# Patient Record
Sex: FEMALE | NOT HISPANIC OR LATINO
[De-identification: names, ages, dates, MRNs, and addresses within clinical notes are randomized per-mention and may not be internally consistent; named-entity substitution may affect disease eponyms.]

---

## 2021-03-05 ENCOUNTER — APPOINTMENT (OUTPATIENT)
Dept: BREAST CENTER | Facility: CLINIC | Age: 46
End: 2021-03-05

## 2023-01-25 PROBLEM — Z00.00 ENCOUNTER FOR PREVENTIVE HEALTH EXAMINATION: Status: ACTIVE | Noted: 2023-01-25

## 2023-01-26 ENCOUNTER — APPOINTMENT (OUTPATIENT)
Dept: OTOLARYNGOLOGY | Facility: CLINIC | Age: 48
End: 2023-01-26
Payer: COMMERCIAL

## 2023-01-26 VITALS — WEIGHT: 135 LBS | HEIGHT: 69.5 IN | BODY MASS INDEX: 19.54 KG/M2

## 2023-01-26 DIAGNOSIS — M26.609 UNSPECIFIED TEMPOROMANDIBULAR JOINT DISORDER: ICD-10-CM

## 2023-01-26 DIAGNOSIS — J02.9 ACUTE PHARYNGITIS, UNSPECIFIED: ICD-10-CM

## 2023-01-26 DIAGNOSIS — J30.0 VASOMOTOR RHINITIS: ICD-10-CM

## 2023-01-26 DIAGNOSIS — H92.09 OTALGIA, UNSPECIFIED EAR: ICD-10-CM

## 2023-01-26 PROCEDURE — 31575 DIAGNOSTIC LARYNGOSCOPY: CPT

## 2023-01-26 PROCEDURE — 99203 OFFICE O/P NEW LOW 30 MIN: CPT | Mod: 25

## 2023-01-26 RX ORDER — IPRATROPIUM BROMIDE 42 UG/1
0.06 SPRAY NASAL 3 TIMES DAILY
Qty: 1 | Refills: 1 | Status: ACTIVE | COMMUNITY
Start: 2023-01-26 | End: 1900-01-01

## 2023-01-26 RX ORDER — LEVOTHYROXINE SODIUM 137 UG/1
TABLET ORAL
Refills: 0 | Status: ACTIVE | COMMUNITY

## 2023-01-26 NOTE — PROCEDURE
[de-identified] : PROCEDURE: Flexible laryngoscopy\par SURGEON: Dr. Ellis\par INDICATIONS: He was unable to tolerate a mirror exam. Assess for laryngopharynx pharyngeal reflux. cough. head and neck mass. \par ANESTHESIA: The patient was placed in a sitting position.  Following application of the topical anesthetic and decongestant, exam was performed with a flexible scope.  The scope was passed along the right nasal floor to the nasopharynx.  It was then passed into the region of the middle meatus, middle turbinate, and sphenoethmoid region.  An identical procedure was performed on the left side.  The following findings were noted:\par \par The nasal musoca was healthy appearing and the septum was roughly midline. The middle meatus and sphenoethmoid recesses were clear bilaterally. The nasopharynx \par \par Nasopharynx: no masses, choanae patent, no adenoid tissue\par \par Base of tongue/vallecula: no masses or asymmetry\par Pharyngeal walls: symmetrical. No masses.\par Pyriform sinuses: no lesions or pooling of secretions.\par Epiglottis: normal. No edema or lesions.\par Aryepiglottic folds: normal. No lesions. \par Vocal cords: clear and mobile. No lesions. Airway patent.\par Arytenoids: no edema or erythema. \par Interarytenoid area: no edema, erythema or lesion.\par

## 2023-01-26 NOTE — ASSESSMENT
[FreeTextEntry1] : In my opinion her primary pathology is TMJ D.\par She uses at bite block.\par I feel that she would benefit from a full evaluation by a TMJ D specialist.\par I have reassured her I see no evidence of an ear infection or throat infection.\par I will give her a trial of Atrovent for her vasomotor rhinitis.

## 2023-01-26 NOTE — HISTORY OF PRESENT ILLNESS
[de-identified] : She is known to me.  I have not seen her in many years.\par Her chief complaint is "ear pain".\par She reports that several weeks ago she had what sounds like a syncopal episode with a loss of consciousness.\par According to her she had a 2-day admission to the hospital and had a full work-up which was all negative.\par She was told that perhaps she had a sinus problem.\par She has pain in her ears that is radiating to the back of her head.\par He took a course of cefdinir which did not help her.\par She does not have a fever.\par He does complain of postnasal drip.

## 2024-03-04 ENCOUNTER — NON-APPOINTMENT (OUTPATIENT)
Age: 49
End: 2024-03-04

## 2024-03-04 ENCOUNTER — APPOINTMENT (OUTPATIENT)
Dept: BREAST CENTER | Facility: CLINIC | Age: 49
End: 2024-03-04
Payer: COMMERCIAL

## 2024-03-04 VITALS
HEIGHT: 69.5 IN | DIASTOLIC BLOOD PRESSURE: 71 MMHG | HEART RATE: 73 BPM | WEIGHT: 133 LBS | SYSTOLIC BLOOD PRESSURE: 105 MMHG | BODY MASS INDEX: 19.26 KG/M2

## 2024-03-04 DIAGNOSIS — Z80.3 FAMILY HISTORY OF MALIGNANT NEOPLASM OF BREAST: ICD-10-CM

## 2024-03-04 DIAGNOSIS — Z86.2 PERSONAL HISTORY OF DISEASES OF THE BLOOD AND BLOOD-FORMING ORGANS AND CERTAIN DISORDERS INVOLVING THE IMMUNE MECHANISM: ICD-10-CM

## 2024-03-04 DIAGNOSIS — Z12.39 ENCOUNTER FOR OTHER SCREENING FOR MALIGNANT NEOPLASM OF BREAST: ICD-10-CM

## 2024-03-04 DIAGNOSIS — Z80.41 FAMILY HISTORY OF MALIGNANT NEOPLASM OF OVARY: ICD-10-CM

## 2024-03-04 PROCEDURE — 99204 OFFICE O/P NEW MOD 45 MIN: CPT

## 2024-03-04 NOTE — PHYSICAL EXAM
[Normocephalic] : normocephalic [Supple] : supple [EOMI] : extra ocular movement intact [No Cervical Adenopathy] : no cervical adenopathy [No Supraclavicular Adenopathy] : no supraclavicular adenopathy [de-identified] : Bilateral breast/axilla/supraclavicular area: No masses, discharge, or adenopathy

## 2024-03-04 NOTE — REVIEW OF SYSTEMS
[Chills] : no chills [Fever] : no fever [Cough] : no cough [Shortness Of Breath] : no shortness of breath [Skin Wound] : no skin wound [Skin Lesions] : no skin lesions

## 2024-03-04 NOTE — HISTORY OF PRESENT ILLNESS
[FreeTextEntry1] : Patient is a 49 yo F who presents today for breast cancer screening and complaints of L palpable lump x 3 weeks ago which has decreased, OLD/NEW LOV 8/2020. She has a strong family history of breast cancer and ovarian cancer. Patient is BRCA negative w/ VUS in [facility] (2018). S/P R benign bx. Patient denies skin changes, or nipple discharge bilaterally. Last breast MRI was 2019 for high-risk screening and last annual B/l MG/US was 8/2020.  JOSEPH Lifetime Risk- 15.3%  11/8/19: MRI- heterogeneously dense, OXANA. BIRADS 1. 8/28/20: B/l MG & US- heterogeneously dense, OXANA. BIRADS 1.

## 2024-03-04 NOTE — PAST MEDICAL HISTORY
[Postmenopausal] : The patient is postmenopausal [Menarche Age ____] : age at menarche was [unfilled] [Menopause Age____] : age at menopause was [unfilled] [Definite ___ (Date)] : the last menstrual period was [unfilled] [Total Preg ___] : G[unfilled] [Live Births ___] : P[unfilled]  [History of Hormone Replacement Treatment] : has no history of hormone replacement treatment [de-identified] : 2018 [FreeTextEntry6] : n/a [FreeTextEntry7] : no [FreeTextEntry8] : n/a

## 2024-10-15 ENCOUNTER — INPATIENT (INPATIENT)
Facility: HOSPITAL | Age: 49
LOS: 6 days | Discharge: HOME | End: 2024-10-22
Attending: INTERNAL MEDICINE | Admitting: HOSPITALIST
Payer: COMMERCIAL

## 2024-10-15 VITALS
WEIGHT: 130.07 LBS | HEIGHT: 69 IN | OXYGEN SATURATION: 100 % | HEART RATE: 100 BPM | DIASTOLIC BLOOD PRESSURE: 59 MMHG | SYSTOLIC BLOOD PRESSURE: 108 MMHG | RESPIRATION RATE: 18 BRPM | TEMPERATURE: 98 F

## 2024-10-15 DIAGNOSIS — R10.9 UNSPECIFIED ABDOMINAL PAIN: ICD-10-CM

## 2024-10-15 DIAGNOSIS — E03.9 HYPOTHYROIDISM, UNSPECIFIED: ICD-10-CM

## 2024-10-15 DIAGNOSIS — Z29.9 ENCOUNTER FOR PROPHYLACTIC MEASURES, UNSPECIFIED: ICD-10-CM

## 2024-10-15 DIAGNOSIS — R39.15 URGENCY OF URINATION: ICD-10-CM

## 2024-10-15 DIAGNOSIS — K90.0 CELIAC DISEASE: ICD-10-CM

## 2024-10-15 DIAGNOSIS — K59.09 OTHER CONSTIPATION: ICD-10-CM

## 2024-10-15 LAB
ALBUMIN SERPL ELPH-MCNC: 4.7 G/DL — SIGNIFICANT CHANGE UP (ref 3.3–5)
ALP SERPL-CCNC: 78 U/L — SIGNIFICANT CHANGE UP (ref 40–120)
ALT FLD-CCNC: 8 U/L — LOW (ref 10–45)
ANION GAP SERPL CALC-SCNC: 10 MMOL/L — SIGNIFICANT CHANGE UP (ref 5–17)
APPEARANCE UR: CLEAR — SIGNIFICANT CHANGE UP
AST SERPL-CCNC: 14 U/L — SIGNIFICANT CHANGE UP (ref 10–40)
BASOPHILS # BLD AUTO: 0.06 K/UL — SIGNIFICANT CHANGE UP (ref 0–0.2)
BASOPHILS NFR BLD AUTO: 1.1 % — SIGNIFICANT CHANGE UP (ref 0–2)
BILIRUB SERPL-MCNC: 0.6 MG/DL — SIGNIFICANT CHANGE UP (ref 0.2–1.2)
BILIRUB UR-MCNC: NEGATIVE — SIGNIFICANT CHANGE UP
BUN SERPL-MCNC: 8 MG/DL — SIGNIFICANT CHANGE UP (ref 7–23)
CALCIUM SERPL-MCNC: 9.4 MG/DL — SIGNIFICANT CHANGE UP (ref 8.4–10.5)
CHLORIDE SERPL-SCNC: 107 MMOL/L — SIGNIFICANT CHANGE UP (ref 96–108)
CO2 SERPL-SCNC: 23 MMOL/L — SIGNIFICANT CHANGE UP (ref 22–31)
COLOR SPEC: YELLOW — SIGNIFICANT CHANGE UP
CREAT SERPL-MCNC: 0.8 MG/DL — SIGNIFICANT CHANGE UP (ref 0.5–1.3)
DIFF PNL FLD: NEGATIVE — SIGNIFICANT CHANGE UP
EGFR: 90 ML/MIN/1.73M2 — SIGNIFICANT CHANGE UP
EOSINOPHIL # BLD AUTO: 0.04 K/UL — SIGNIFICANT CHANGE UP (ref 0–0.5)
EOSINOPHIL NFR BLD AUTO: 0.7 % — SIGNIFICANT CHANGE UP (ref 0–6)
GLUCOSE SERPL-MCNC: 105 MG/DL — HIGH (ref 70–99)
GLUCOSE UR QL: NEGATIVE MG/DL — SIGNIFICANT CHANGE UP
HCG SERPL-ACNC: 1 MIU/ML — SIGNIFICANT CHANGE UP
HCT VFR BLD CALC: 37.8 % — SIGNIFICANT CHANGE UP (ref 34.5–45)
HGB BLD-MCNC: 12.8 G/DL — SIGNIFICANT CHANGE UP (ref 11.5–15.5)
IMM GRANULOCYTES NFR BLD AUTO: 0.4 % — SIGNIFICANT CHANGE UP (ref 0–0.9)
KETONES UR-MCNC: NEGATIVE MG/DL — SIGNIFICANT CHANGE UP
LEUKOCYTE ESTERASE UR-ACNC: NEGATIVE — SIGNIFICANT CHANGE UP
LIDOCAIN IGE QN: 21 U/L — SIGNIFICANT CHANGE UP (ref 7–60)
LYMPHOCYTES # BLD AUTO: 2.2 K/UL — SIGNIFICANT CHANGE UP (ref 1–3.3)
LYMPHOCYTES # BLD AUTO: 38.8 % — SIGNIFICANT CHANGE UP (ref 13–44)
MCHC RBC-ENTMCNC: 31.8 PG — SIGNIFICANT CHANGE UP (ref 27–34)
MCHC RBC-ENTMCNC: 33.9 GM/DL — SIGNIFICANT CHANGE UP (ref 32–36)
MCV RBC AUTO: 94 FL — SIGNIFICANT CHANGE UP (ref 80–100)
MONOCYTES # BLD AUTO: 0.41 K/UL — SIGNIFICANT CHANGE UP (ref 0–0.9)
MONOCYTES NFR BLD AUTO: 7.2 % — SIGNIFICANT CHANGE UP (ref 2–14)
NEUTROPHILS # BLD AUTO: 2.94 K/UL — SIGNIFICANT CHANGE UP (ref 1.8–7.4)
NEUTROPHILS NFR BLD AUTO: 51.8 % — SIGNIFICANT CHANGE UP (ref 43–77)
NITRITE UR-MCNC: NEGATIVE — SIGNIFICANT CHANGE UP
NRBC # BLD: 0 /100 WBCS — SIGNIFICANT CHANGE UP (ref 0–0)
PH UR: 5 — SIGNIFICANT CHANGE UP (ref 5–8)
PLATELET # BLD AUTO: 265 K/UL — SIGNIFICANT CHANGE UP (ref 150–400)
POTASSIUM SERPL-MCNC: 4.8 MMOL/L — SIGNIFICANT CHANGE UP (ref 3.5–5.3)
POTASSIUM SERPL-SCNC: 4.8 MMOL/L — SIGNIFICANT CHANGE UP (ref 3.5–5.3)
PROT SERPL-MCNC: 7.4 G/DL — SIGNIFICANT CHANGE UP (ref 6–8.3)
PROT UR-MCNC: NEGATIVE MG/DL — SIGNIFICANT CHANGE UP
RBC # BLD: 4.02 M/UL — SIGNIFICANT CHANGE UP (ref 3.8–5.2)
RBC # FLD: 12.7 % — SIGNIFICANT CHANGE UP (ref 10.3–14.5)
SODIUM SERPL-SCNC: 140 MMOL/L — SIGNIFICANT CHANGE UP (ref 135–145)
SP GR SPEC: 1.02 — SIGNIFICANT CHANGE UP (ref 1–1.03)
UROBILINOGEN FLD QL: 0.2 MG/DL — SIGNIFICANT CHANGE UP (ref 0.2–1)
WBC # BLD: 5.67 K/UL — SIGNIFICANT CHANGE UP (ref 3.8–10.5)
WBC # FLD AUTO: 5.67 K/UL — SIGNIFICANT CHANGE UP (ref 3.8–10.5)

## 2024-10-15 PROCEDURE — 74177 CT ABD & PELVIS W/CONTRAST: CPT | Mod: 26,MC

## 2024-10-15 PROCEDURE — 76770 US EXAM ABDO BACK WALL COMP: CPT | Mod: 26

## 2024-10-15 PROCEDURE — 99223 1ST HOSP IP/OBS HIGH 75: CPT

## 2024-10-15 PROCEDURE — 76856 US EXAM PELVIC COMPLETE: CPT | Mod: 26

## 2024-10-15 PROCEDURE — 76830 TRANSVAGINAL US NON-OB: CPT | Mod: 26

## 2024-10-15 PROCEDURE — 99221 1ST HOSP IP/OBS SF/LOW 40: CPT

## 2024-10-15 PROCEDURE — 99285 EMERGENCY DEPT VISIT HI MDM: CPT

## 2024-10-15 RX ORDER — MAGNESIUM, ALUMINUM HYDROXIDE 200-200 MG
30 TABLET,CHEWABLE ORAL EVERY 4 HOURS
Refills: 0 | Status: DISCONTINUED | OUTPATIENT
Start: 2024-10-15 | End: 2024-10-22

## 2024-10-15 RX ORDER — KETOROLAC TROMETHAMINE 30 MG/ML
30 INJECTION INTRAMUSCULAR; INTRAVENOUS EVERY 6 HOURS
Refills: 0 | Status: DISCONTINUED | OUTPATIENT
Start: 2024-10-15 | End: 2024-10-15

## 2024-10-15 RX ORDER — LEVOTHYROXINE SODIUM 88 MCG
1 TABLET ORAL
Refills: 0 | DISCHARGE

## 2024-10-15 RX ORDER — MORPHINE SULFATE 30 MG/1
4 TABLET, EXTENDED RELEASE ORAL EVERY 6 HOURS
Refills: 0 | Status: DISCONTINUED | OUTPATIENT
Start: 2024-10-15 | End: 2024-10-16

## 2024-10-15 RX ORDER — POLYETHYLENE GLYCOL 3350 17 G/17G
17 POWDER, FOR SOLUTION ORAL EVERY 12 HOURS
Refills: 0 | Status: DISCONTINUED | OUTPATIENT
Start: 2024-10-15 | End: 2024-10-22

## 2024-10-15 RX ORDER — ACETAMINOPHEN 500 MG
650 TABLET ORAL ONCE
Refills: 0 | Status: COMPLETED | OUTPATIENT
Start: 2024-10-15 | End: 2024-10-15

## 2024-10-15 RX ORDER — SODIUM CHLORIDE 9 MG/ML
1000 INJECTION, SOLUTION INTRAMUSCULAR; INTRAVENOUS; SUBCUTANEOUS ONCE
Refills: 0 | Status: COMPLETED | OUTPATIENT
Start: 2024-10-15 | End: 2024-10-15

## 2024-10-15 RX ORDER — LIDOCAINE HYDROCHLORIDE 40 MG/ML
1 SOLUTION TOPICAL EVERY 24 HOURS
Refills: 0 | Status: DISCONTINUED | OUTPATIENT
Start: 2024-10-15 | End: 2024-10-22

## 2024-10-15 RX ORDER — MELATONIN 5 MG
3 TABLET ORAL AT BEDTIME
Refills: 0 | Status: DISCONTINUED | OUTPATIENT
Start: 2024-10-15 | End: 2024-10-22

## 2024-10-15 RX ORDER — LEVOTHYROXINE SODIUM 88 MCG
50 TABLET ORAL EVERY 24 HOURS
Refills: 0 | Status: DISCONTINUED | OUTPATIENT
Start: 2024-10-16 | End: 2024-10-22

## 2024-10-15 RX ORDER — INFLUENZ VIR VAC TV P-SURF2003 15MCG/.5ML
0.5 SYRINGE (ML) INTRAMUSCULAR ONCE
Refills: 0 | Status: DISCONTINUED | OUTPATIENT
Start: 2024-10-15 | End: 2024-10-22

## 2024-10-15 RX ORDER — MORPHINE SULFATE 30 MG/1
4 TABLET, EXTENDED RELEASE ORAL ONCE
Refills: 0 | Status: DISCONTINUED | OUTPATIENT
Start: 2024-10-15 | End: 2024-10-15

## 2024-10-15 RX ORDER — ACETAMINOPHEN 500 MG
650 TABLET ORAL EVERY 6 HOURS
Refills: 0 | Status: DISCONTINUED | OUTPATIENT
Start: 2024-10-15 | End: 2024-10-22

## 2024-10-15 RX ORDER — ENOXAPARIN SODIUM 40MG/0.4ML
40 SYRINGE (ML) SUBCUTANEOUS EVERY 24 HOURS
Refills: 0 | Status: DISCONTINUED | OUTPATIENT
Start: 2024-10-15 | End: 2024-10-22

## 2024-10-15 RX ORDER — ONDANSETRON HYDROCHLORIDE 2 MG/ML
4 INJECTION, SOLUTION INTRAMUSCULAR; INTRAVENOUS EVERY 8 HOURS
Refills: 0 | Status: DISCONTINUED | OUTPATIENT
Start: 2024-10-15 | End: 2024-10-22

## 2024-10-15 RX ORDER — KETOROLAC TROMETHAMINE 30 MG/ML
15 INJECTION INTRAMUSCULAR; INTRAVENOUS EVERY 6 HOURS
Refills: 0 | Status: DISCONTINUED | OUTPATIENT
Start: 2024-10-15 | End: 2024-10-17

## 2024-10-15 RX ORDER — SENNA 187 MG
2 TABLET ORAL AT BEDTIME
Refills: 0 | Status: DISCONTINUED | OUTPATIENT
Start: 2024-10-15 | End: 2024-10-22

## 2024-10-15 RX ORDER — ONDANSETRON HYDROCHLORIDE 2 MG/ML
4 INJECTION, SOLUTION INTRAMUSCULAR; INTRAVENOUS ONCE
Refills: 0 | Status: COMPLETED | OUTPATIENT
Start: 2024-10-15 | End: 2024-10-15

## 2024-10-15 RX ADMIN — MORPHINE SULFATE 4 MILLIGRAM(S): 30 TABLET, EXTENDED RELEASE ORAL at 18:30

## 2024-10-15 RX ADMIN — MORPHINE SULFATE 4 MILLIGRAM(S): 30 TABLET, EXTENDED RELEASE ORAL at 13:22

## 2024-10-15 RX ADMIN — MORPHINE SULFATE 4 MILLIGRAM(S): 30 TABLET, EXTENDED RELEASE ORAL at 14:07

## 2024-10-15 RX ADMIN — Medication 650 MILLIGRAM(S): at 10:16

## 2024-10-15 RX ADMIN — POLYETHYLENE GLYCOL 3350 17 GRAM(S): 17 POWDER, FOR SOLUTION ORAL at 18:30

## 2024-10-15 RX ADMIN — ONDANSETRON HYDROCHLORIDE 4 MILLIGRAM(S): 2 INJECTION, SOLUTION INTRAMUSCULAR; INTRAVENOUS at 13:21

## 2024-10-15 RX ADMIN — Medication 650 MILLIGRAM(S): at 14:07

## 2024-10-15 RX ADMIN — MORPHINE SULFATE 4 MILLIGRAM(S): 30 TABLET, EXTENDED RELEASE ORAL at 18:45

## 2024-10-15 RX ADMIN — Medication 2 TABLET(S): at 22:42

## 2024-10-15 NOTE — H&P ADULT - ASSESSMENT
48F w/ PMH of hypothyroid, celiac disease, appendectomy and chronic constipation, who is presenting with 2 months of severe lower abdominal pain and urinary urgency, with no abnormal findings on pelvic exam, transvaginal and pelvic US and CTAP, being admitted for management of severe abdominal  48F w/ PMH of hypothyroid, celiac disease, appendectomy and chronic constipation, who is presenting with 2 months of severe lower abdominal pain and urinary urgency, with no abnormal findings on pelvic exam, transvaginal and pelvic US and CTAP, being admitted for management of severe abdominal pain

## 2024-10-15 NOTE — ED PROVIDER NOTE - PROGRESS NOTE DETAILS
MD Cheryl:  - hcg neg. ua wnl  - tvus: IMPRESSION:  Normal pelvic sonogram.  - ctap: IMPRESSION:  No acute findings.  No evidence of acute pyelonephritis.   - pt states she's in pain, morphine ordered. on reassessment pt states she is severe abdominal pain despite negative workup . abdomen soft ndnt on exam. pt states she is afraid to go home because she can't walk due to severe pain. more morphine offered which pt declines. pt accepts offer for admission for intractable abdominal pain. d/w liam, will admit

## 2024-10-15 NOTE — H&P ADULT - ATTENDING COMMENTS
48F with hypoTh, celiac disease, appendectomy 2017, chronic constipation with chronic abdominal pain, presenting for subacute worsening of lower abd pain and dysuria.     Extensive work up in ED done including CT AP, TVUS, and RP US without acute findings. Does have stool burden. UA is bland and labs unremarkable. Patient is overall well-appearing and conversant, however became very distressed and tearful when discussing her pain and long process and attempts at diagnosis and improvement. Deferred abd palpation exam due to patient distress and discomfort, she states that even light stroking of her skin overlying pelvis causes significant pain. Not associated with food intake. Weight fluctuates but no significant weight loss.    #subacute on chronic abd pain - suspect multifactorial etiology, no acute pathology seen on extensive imaging done in ED. Suspect component of chronic constipation and IBS-C with hyperesthesia, pain is OOP to exam. No indication of infectious process, low concern for mesenteric ischemia given no risk factors and largely normal labs, though can consider CTA for eval if no improvement with bowel regimen.   - trial of bowel regimen with miralax, senna - escalate pending clinical response  - discussed at length avoidance of opioids due to worsening constipation and concern for opioid hyperalgesia, however pt requiring relief to pain in order to facilitate treatment - can do morphine 4 q6h with plans to stop pending response to bowel regimen, discussed this with her  - encouraged PO hydration, ambulation  - pending clinical course, consider GI consult vs CTA A/P if clinical concern for mesenteric ischemia  - less likely endometriosis given post-menopausal for years    #dusyria #urinary frequency - not c/w UTI, likely interstitial cystitis vs atrophic vaginitis iso early menopause.  - Uro consulted, rec bladder scan to eval for retention, outpt urodynamic studies  - try ditropan, pyridium

## 2024-10-15 NOTE — H&P ADULT - NSHPPHYSICALEXAM_GEN_ALL_CORE
GENERAL: in mild distress, at times tearful, sitting in bed   HEAD:  Atraumatic, normocephalic  EYES: EOMI, PERRL  NECK: Supple, trachea midline, no JVD  HEART: Regular rate and rhythm  LUNGS: Unlabored respirations.  Clear to auscultation bilaterally, no crackles, wheezing, or rhonchi  ABDOMEN: hard to palpation, tender in all 4 quadrants, mildly distended, +BS  EXTREMITIES: 2+ peripheral pulses bilaterally. No clubbing, cyanosis, or edema  NERVOUS SYSTEM:  A&Ox3, moving all extremities, no focal deficits

## 2024-10-15 NOTE — ED ADULT NURSE NOTE - NSFALLUNIVINTERV_ED_ALL_ED
Bed/Stretcher in lowest position, wheels locked, appropriate side rails in place/Call bell, personal items and telephone in reach/Instruct patient to call for assistance before getting out of bed/chair/stretcher/Non-slip footwear applied when patient is off stretcher/[place] to call system/Physically safe environment - no spills, clutter or unnecessary equipment/Purposeful proactive rounding/Room/bathroom lighting operational, light cord in reach

## 2024-10-15 NOTE — ED PROVIDER NOTE - PHYSICAL EXAMINATION
GENERAL:  Awake, alert and in NAD, non-toxic appearing  ENMT: Airway patent  EYES: conjunctiva clear  CARDIAC: Regular rate, regular rhythm.  Heart sounds S1, S2, no S3, S4. No murmurs, rubs or gallops.  RESPIRATORY: Breath sounds clear and equal in bilateral anterior lung fields, no wheezes/ronchi/crackles/stridor; pt breathing and speaking comfortably with no increased WOB, no accessory mm. use, no intercostal retractions, no nasal flaring  GI: abdomen soft, non-distended, non-tender, no rebound or guarding in ruq/luq/rlq/llq/epigastrium/suprapubic region, ?R CVAT   (chaperone RN): scant creamy white vaginal discharge, no lesions/erythema/fb/bleeding, no cottage cheese like discharge, no bilat adnexal ttp or cmt  SKIN: warm and dry, no rashes  PSYCH: awake, alert, calm and cooperative  EXTREMITIES: no ble edema  NEURO: gcs 15

## 2024-10-15 NOTE — H&P ADULT - PROBLEM SELECTOR PLAN 1
Pt presenting with 2 months of severe lower abdominal pain L>R, radiating towards chest iso chronic constipation as well as concurrent urinary urgency related pain, w/ associated n/v, lightheadedness, and joint pains. CTAP shows stool burden, abdominal pain likely 2/2 chronic constipation, last "normal" BM was 2 weeks ago, pt has some scant BM every other day that are very soft/watery. Unlikely ovarian torsion, ectopic pregnancy, diverticulitis given negative imaging and labs.     Plan:  - bowel regimen: Miralax BID, senna QHS, lactulose, consider tap water enema  - monitor stool counts  - pain regimen: standing tylenol 1g q8, lidocaine patches, heat and ice packs; Toradol 15mg IVP q6 PRN, morphine 2mg IVP PRN for moderate, morphine 4mg IVP PRN for severe   - can consider uptitrating pain regimen if not adequately treating pain Pt p/w 2 months of severe lower abdominal pain L>R, radiating towards chest iso chronic constipation as well as concurrent urinary urgency related pain, w/ associated n/v, lightheadedness, and joint pains. CTAP shows stool burden, abdominal pain likely 2/2 chronic constipation, last "normal" BM was 2 weeks ago, pt has some scant BM every other day that are very soft/watery. Less likely abdominal 2/2 ovarian torsion, ectopic pregnancy, diverticulitis, GI obstruction given negative imaging and labs.     Plan:  - bowel regimen: Miralax BID, senna QHS, lactulose, consider tap water enema  - monitor stool counts  - pain regimen: standing tylenol 1g q8, lidocaine patches, heat and ice packs; Toradol 15mg IVP q6 PRN, morphine 2mg IVP PRN for moderate, morphine 4mg IVP PRN for severe   - can consider uptitrating pain regimen if not adequately treating pain Pt p/w 2 months of severe lower abdominal pain L>R, radiating towards chest iso chronic constipation as well as concurrent urinary urgency related pain, w/ associated n/v, lightheadedness, and joint pains. CTAP shows stool burden, abdominal pain likely 2/2 chronic constipation, last "normal" BM was 2 weeks ago, pt has some scant BM every other day that are very soft/watery. Can consider chronic mesenteric ischemia if no improvement in pain despite BMs. Less likely abdominal pain 2/2 ovarian torsion, ectopic pregnancy, diverticulitis, GI obstruction given negative imaging and labs.     Plan:  - bowel regimen: Miralax BID, senna QHS, consider tap water enema if no BM in 24 hours, if no BM by tonight can offer suppository  - monitor stool counts  - pain regimen: lidocaine patches, heat and ice packs; tylenol 1g q8 PRN, Toradol 15mg IVP q6 PRN for moderate, toradol 30mg IVP q6 PRN for severe  - can consider up-titrating pain regimen if not adequately treating pain Pt p/w 2 months of severe lower abdominal pain L>R, radiating towards chest iso chronic constipation as well as concurrent urinary urgency related pain, w/ associated n/v, lightheadedness, and joint pains. CTAP shows stool burden, abdominal pain likely 2/2 chronic constipation, last "normal" BM was 2 weeks ago, pt has some scant BM every other day that are very soft/watery. Can consider chronic mesenteric ischemia if no improvement in pain despite BMs. Less likely abdominal pain 2/2 ovarian torsion, ectopic pregnancy, diverticulitis, GI obstruction given negative imaging and labs.     Plan:  - bowel regimen: Miralax BID, senna QHS, consider tap water enema if no BM in 24 hours, if no BM by tonight can offer suppository  - monitor stool counts  - pain regimen: lidocaine patches, heat and ice packs; tylenol 1g q8 PRN, Toradol 15mg IVP q6 PRN for moderate, morphine 4mg IVP q6 PRN for severe pain  - can consider up-titrating pain regimen if not adequately treating pain

## 2024-10-15 NOTE — CONSULT NOTE ADULT - SUBJECTIVE AND OBJECTIVE BOX
CONSULT NOTE:  HPI:   49 yo f with pmhx hypothyroid, appendectomy, presenting to ER with intractable pain, Patients states pain has been present approx one month, States she saw her PMD who told her it was uti but never sent a urine culture and started her on 4 seperate abx for a total of 21 days (levofloxacin, cipro, cefuroxime and ?) she believes while taking one of them her symptoms improved slightly. She complains, of nausea, intermittent vomiting ( most recently this morning x 1 ) and chronic constipation. She denies any history of stones, or any flank pain at this time. Patient also complains of incomplete bladder emptying, states as soon as she voids she feels like she has to void again. She complains of bladder pressure, frequency, intermittent dysuria but denies hematuria. States saw gynocologist recently who told her the exam was totally normal but states she has been on chronic fluconazole for weeks for presumed yeast infection.     Vital Signs Last 24 Hrs  T(C): 37.1 (15 Oct 2024 13:13), Max: 37.1 (15 Oct 2024 13:13)  T(F): 98.7 (15 Oct 2024 13:13), Max: 98.7 (15 Oct 2024 13:13)  HR: 69 (15 Oct 2024 13:13) (69 - 100)  BP: 129/83 (15 Oct 2024 13:13) (108/59 - 129/83)  BP(mean): --  RR: 18 (15 Oct 2024 13:13) (18 - 18)  SpO2: 99% (15 Oct 2024 13:13) (99% - 100%)    Parameters below as of 15 Oct 2024 13:13  Patient On (Oxygen Delivery Method): room air      I&O's Summary      PE:  Gen: nad  Abd: soft, NT, ND  : voiding clear urine    LABS:                        12.8   5.67  )-----------( 265      ( 15 Oct 2024 09:29 )             37.8     10-15    140  |  107  |  8   ----------------------------<  105[H]  4.8   |  23  |  0.80    Ca    9.4      15 Oct 2024 09:29    TPro  7.4  /  Alb  4.7  /  TBili  0.6  /  DBili  x   /  AST  14  /  ALT  8[L]  /  AlkPhos  78  10-15      Cultures      A/P 49 yo f with intractable abd pain  1) would follow up urine culture  2) cont abx  3) q4-6 hour pvrs to rule out retention  4) bowel reg to resolve constipation  5) can try ditropan and pyridium  6) will need outpattient follow up for outpatient urodynamic studies

## 2024-10-15 NOTE — ED ADULT NURSE NOTE - OBJECTIVE STATEMENT
The patient is a 48y Female complaining of urinary symptoms. Pt reports pelvic pain and urinary urgency n/v x 2 months on and off, has been prescribed abx previously and dx with yeast infection. Pt denies CP, SOB, F/C.

## 2024-10-15 NOTE — H&P ADULT - PROBLEM SELECTOR PLAN 4
Pt on home synthroid    Plan:  - continue synthroid Pt on home synthroid 50mcg    Plan:  - continue synthroid 50mcg

## 2024-10-15 NOTE — ED ADULT NURSE REASSESSMENT NOTE - NS ED NURSE REASSESS COMMENT FT1
Patient aoX3, no c/o of pelvic pain or dysuria at this time s/p meds.  Vital signs stable.  For regional medicine admit.  Transfer pending.
Patient care and endorsment received from previous RN.  Patient a/oX 4, ambulatory, c/o of 2 months UTI symptoms, Dx w/ yeast infection, completed antibiotics but recurrence of pelvic pain, dysuria and urinary frequency.  Vital signs stable.  PIV #20 to right AC.  US and CT scan done, results WNL.  Morphine and Zofran administered.  Admitted for continued evaluation.
Pt reports no nausea and pain has improved after medication.

## 2024-10-15 NOTE — ED PROVIDER NOTE - CLINICAL SUMMARY MEDICAL DECISION MAKING FREE TEXT BOX
48F c PMH of thyroid disorder, PSH of appendectomy p/w 1 month hx of bilat pelvic pain, dysuria, and white vaginal discharge. On exam, bp 108/59 , VS otherwise wnl, scant white vaginal discharge ?R CVAT.    ddx: Chlamydia vs Gonorrhea vs BV vs pid vs uti    Plan:  - labs: cbc wnl  - ua: negative wnl  - tvus, us renal  - tylenol, ivf

## 2024-10-15 NOTE — ED ADULT NURSE NOTE - CAS EDN DISCHARGE ASSESSMENT
Alert and oriented to person, place and time/Patient baseline mental status/Awake Alert and oriented to person, place and time/Patient baseline mental status/Awake/Symptoms improved

## 2024-10-15 NOTE — ED PROVIDER NOTE - OBJECTIVE STATEMENT
48F c PMH of thyroid disorder, PSH of appendectomy p/w 1 month hx of bilat pelvic pain, dysuria, and white vaginal discharge. pt described the discharge as not thick and not like cottage cheese. went to pmd 1 m/a and was dx with uti, was prescribed cefuroxime then ciprofloxacin, ucx not obtained, finished those abx and then was prescribed fluconazole in the past week as well as has been taking monostat without relief of sx. reports being monogamous with . denies fever/emesis. 48F c PMH of thyroid disorder, celiac disease, PSH of appendectomy p/w 1 month hx of bilat pelvic pain, dysuria, and white vaginal discharge. pt described the discharge as not thick and not like cottage cheese. went to pmd 1 m/a and was dx with uti, was prescribed cefuroxime then ciprofloxacin, ucx not obtained, finished those abx and then was prescribed fluconazole in the past week as well as has been taking monostat without relief of sx. reports being monogamous with . denies fever/emesis. had normal egd and colonscopy.

## 2024-10-15 NOTE — H&P ADULT - HISTORY OF PRESENT ILLNESS
Patient is a 48F w/ PMH of hypothyroid, celiac disease, appendectomy and chronic constipation, who is presenting with 2 months of severe lower abdominal pain and urinary urgency. Pt reports that since late August 2024, patient has had severe 10/10 lower abdominal pain L>R that radiates up towards the ribcage and chest. Pt states that pain is debilitating and she has not been able to live her life, pt states that she has had moments where she cannot fathom continuing to live in her current pain. Pt reports that along with the pain she has had urinary urgency and frequency, feeling like she not emptying her bladder. She has taken 1 week course of cefuroxime (9/16-9/23), ciprofloxacin (9/25-10/2) and levofloxacin (10/3-10/10), pt states she felt some mild improvement while taking levofloxacin but severe abdominal pain with urinary urgency has returned in the past 5 days. Pt states pain is associated with nausea/vomiting, lightheadedness and headaches 2/2 pain. Pt denies fevers/chills or burning with urination. Pt reports being sexually active, monogamous with episode of gential herpes 20-30years ago without recurrence. Of note, patient had a burst appendix in 2017 w/ appendectomy, pt states at the time course was complicated by acute liver injury w/ hepatomegaly. Pt believes that her being ill during time of appendectomy led to early onset menopause and patient has not had menstrual cycle since 2017 (age 42). Pt has been chronically constipated for 3-4 years, pt reports that last normal BM was 2 weeks and prior to that was 2 months. Her BM are usually 2 small, "wormy" like soft stools, whereas her normal BM consist of very hard, rock like stools. Pt reports that she does have hemorrhoids. Of note, patient reports worsening of joint pains and difficulty walking when having these episodes of severe abdominal pain, pt has been worked up for RA by PCP with negative screening.     ED Course:  Vitals: 98F, /59, , RR 18 100% RA   Labs: WBC 5.67, Hgb 12.8, Na 140, K 4.8, BUN/Cr 9/0.8, AST/ALT 14/8, Lipase 21, HCG 1, UA negative  Imaging:   US transvaginal and pelvis - Uterus, B/L ovaries all WNL no cysts found  US Retroperitoneum - Normal renal ultrasound.  CT AP w/ IV contrast - No acute findings. No evidence of acute pyelonephritis.  Intervention: Tylenol PO x1, morphine 4mg IVP x1, Zofran x1,   Consults: Urology    Patient is a 48F w/ PMH of hypothyroid, celiac disease, appendectomy and chronic constipation, who is presenting with 2 months of severe lower abdominal pain and urinary urgency. Pt reports that since late August 2024, patient has had severe 10/10 lower abdominal pain L>R that radiates up towards the ribcage and chest. Pt states that pain is debilitating and she has not been able to live her life, pt states that she has had moments where she cannot fathom continuing to live in her current pain. Pt reports that along with the pain she has had urinary urgency and frequency, feeling like she not emptying her bladder. She has taken 1 week course of cefuroxime (9/16-9/23), ciprofloxacin (9/25-10/2) and levofloxacin (10/3-10/10), pt states she felt some mild improvement while taking levofloxacin but severe abdominal pain with urinary urgency has returned in the past 5 days. Pt states pain is associated with nausea/vomiting, lightheadedness and headaches 2/2 pain. Pt denies fevers/chills or burning with urination. Pt reports being sexually active, monogamous with episode of gential herpes 20-30years ago without recurrence. Of note, patient had a burst appendix in 2017 w/ appendectomy, pt states at the time course was complicated by acute liver injury w/ hepatomegaly. Pt believes that her being ill during time of appendectomy led to early onset menopause and patient has not had menstrual cycle since 2017 (age 42). Pt has been chronically constipated for 3-4 years, pt reports that last normal BM was 2 weeks and prior to that was 2 months. Her BM are usually 2 small, "wormy" like soft/watery stools, whereas her normal BM consist of very hard, rock like stools. Pt reports that she does have hemorrhoids. Of note, patient reports worsening of joint pains and difficulty walking when having these episodes of severe abdominal pain, pt has been worked up for RA by PCP with negative screening.     ED Course:  Vitals: 98F, /59, , RR 18 100% RA   Labs: WBC 5.67, Hgb 12.8, Na 140, K 4.8, BUN/Cr 9/0.8, AST/ALT 14/8, Lipase 21, HCG 1, UA negative  Imaging:   US transvaginal and pelvis - Uterus, B/L ovaries all WNL no cysts found  US Retroperitoneum - Normal renal ultrasound.  CT AP w/ IV contrast - No acute findings. No evidence of acute pyelonephritis.  Intervention: Tylenol PO x1, morphine 4mg IVP x1, Zofran x1,   Consults: Urology

## 2024-10-15 NOTE — H&P ADULT - PROBLEM SELECTOR PLAN 5
Pt with hx of Celiac disease, does not present with symptoms of Celiac flare    Plan:  - continue to monitor symptoms, avoid gluten in diet

## 2024-10-15 NOTE — H&P ADULT - TIME BILLING
Bedside exam and interview   Reviewed vitals, labs   Discussed patient's plan of care with house staff   Documentation of encounter  Excludes teaching time and/or separately reported services

## 2024-10-15 NOTE — ED ADULT TRIAGE NOTE - CHIEF COMPLAINT QUOTE
Pt presents to ED here for dysuria and hematuria, pelvic pain, was diagnosed with yeast inflection. + nausea, vomiting. Pt A&Ox4, NAD. Pt was on abx for UTI 3 weeks ago.

## 2024-10-15 NOTE — H&P ADULT - PROBLEM SELECTOR PLAN 3
Pt with history of 3-4 years chronic constipation, has had colonoscopy 3 years ago with no abnormal findings was diagnosed with IBS-c. Pt reports having scant, soft/watery stools every other day, last "normal" BM was 2 weeks ago and the one prior was 2 months ago. Pt states those BMs are stools that are rock hard.    Plan:  - bowel regimen as stated above  - monitor stool counts Pt with history of 3-4 years chronic constipation, has had colonoscopy 3 years ago with no abnormal findings was diagnosed with IBS-c. Pt reports having scant, soft/watery stools every other day, last "normal" BM was 2 weeks ago and the one prior was 2 months ago, these BMs are hard stools.    Plan:  - bowel regimen as stated above  - monitor stool counts

## 2024-10-15 NOTE — H&P ADULT - PROBLEM SELECTOR PLAN 2
Pt is s/p menopause at age 42, has had urinary urgency and frequency for past 2 months now complaining of mostly urgency, pt reports feeling constant, painful feelings of urinary urgency and also reports feelings of not fully emptying bladder. Potential urinary dysfunction iso postmenopausal status, may benefit for urogyn evaluation. UA is negative, pt is s/p 3 rounds of abx (cefuroxime, Ciprofloxacin, and levofloxacin).     Plan:  - urology consulted, appreciate recs  - f/u urine cultures  - bladder scans q6 Pt s/p menopause at age 42, has urinary urgency for past 2 months. Potential urinary dysfunction iso postmenopausal status, may benefit for urogyn evaluation. UA is negative, pt is s/p 3 rounds of abx (cefuroxime, Ciprofloxacin, and levofloxacin). Less likely GYN related given negative labs and imaging.    Plan:  - urology consulted, appreciate recs  - f/u urine cultures  - f/u STI panel  - bladder scans q6 Pt s/p menopause at age 42, has urinary urgency for past 2 months. Potential urinary dysfunction 2/2 chronic interstitial cystitis, may benefit for urogyn evaluation outpatient. UA is negative, pt is s/p 3 rounds of abx (cefuroxime, Ciprofloxacin, and levofloxacin). Less likely GYN related given negative labs and imaging.    Plan:  - urology consulted, appreciate recs  - f/u urine cultures  - f/u STI panel  - bladder scans q6 Pt s/p menopause at age 42, has urinary urgency for past 2 months. Potential urinary dysfunction 2/2 chronic interstitial cystitis. UA is negative, pt is s/p 3 rounds of abx (cefuroxime, Ciprofloxacin, and levofloxacin). Less likely GYN related given negative labs and imaging.    Plan:  - urology consulted, appreciate recs in regards to possible interstitial cystitis as cause of pain   - f/u urine cultures  - f/u STI panel  - bladder scans q6

## 2024-10-15 NOTE — H&P ADULT - NSHPLABSRESULTS_GEN_ALL_CORE
.  LABS:                         12.8   5.67  )-----------( 265      ( 15 Oct 2024 09:29 )             37.8     10-15    140  |  107  |  8   ----------------------------<  105[H]  4.8   |  23  |  0.80    Ca    9.4      15 Oct 2024 09:29    TPro  7.4  /  Alb  4.7  /  TBili  0.6  /  DBili  x   /  AST  14  /  ALT  8[L]  /  AlkPhos  78  10-15      Urinalysis Basic - ( 15 Oct 2024 10:15 )    Color: Yellow / Appearance: Clear / S.018 / pH: x  Gluc: x / Ketone: Negative mg/dL  / Bili: Negative / Urobili: 0.2 mg/dL   Blood: x / Protein: Negative mg/dL / Nitrite: Negative   Leuk Esterase: Negative / RBC: x / WBC x   Sq Epi: x / Non Sq Epi: x / Bacteria: x                RADIOLOGY, EKG & ADDITIONAL TESTS: Reviewed.

## 2024-10-16 ENCOUNTER — TRANSCRIPTION ENCOUNTER (OUTPATIENT)
Age: 49
End: 2024-10-16

## 2024-10-16 LAB
ALBUMIN SERPL ELPH-MCNC: 4.4 G/DL — SIGNIFICANT CHANGE UP (ref 3.3–5)
ALP SERPL-CCNC: 70 U/L — SIGNIFICANT CHANGE UP (ref 40–120)
ALT FLD-CCNC: 8 U/L — LOW (ref 10–45)
ANION GAP SERPL CALC-SCNC: 13 MMOL/L — SIGNIFICANT CHANGE UP (ref 5–17)
AST SERPL-CCNC: 13 U/L — SIGNIFICANT CHANGE UP (ref 10–40)
BILIRUB SERPL-MCNC: 0.7 MG/DL — SIGNIFICANT CHANGE UP (ref 0.2–1.2)
BUN SERPL-MCNC: 8 MG/DL — SIGNIFICANT CHANGE UP (ref 7–23)
C TRACH RRNA SPEC QL NAA+PROBE: SIGNIFICANT CHANGE UP
CALCIUM SERPL-MCNC: 9.6 MG/DL — SIGNIFICANT CHANGE UP (ref 8.4–10.5)
CHLORIDE SERPL-SCNC: 102 MMOL/L — SIGNIFICANT CHANGE UP (ref 96–108)
CO2 SERPL-SCNC: 24 MMOL/L — SIGNIFICANT CHANGE UP (ref 22–31)
CREAT SERPL-MCNC: 0.81 MG/DL — SIGNIFICANT CHANGE UP (ref 0.5–1.3)
CULTURE RESULTS: SIGNIFICANT CHANGE UP
EGFR: 89 ML/MIN/1.73M2 — SIGNIFICANT CHANGE UP
GLUCOSE SERPL-MCNC: 87 MG/DL — SIGNIFICANT CHANGE UP (ref 70–99)
HCT VFR BLD CALC: 37.7 % — SIGNIFICANT CHANGE UP (ref 34.5–45)
HGB BLD-MCNC: 12.8 G/DL — SIGNIFICANT CHANGE UP (ref 11.5–15.5)
MAGNESIUM SERPL-MCNC: 2.1 MG/DL — SIGNIFICANT CHANGE UP (ref 1.6–2.6)
MCHC RBC-ENTMCNC: 31.8 PG — SIGNIFICANT CHANGE UP (ref 27–34)
MCHC RBC-ENTMCNC: 34 GM/DL — SIGNIFICANT CHANGE UP (ref 32–36)
MCV RBC AUTO: 93.8 FL — SIGNIFICANT CHANGE UP (ref 80–100)
N GONORRHOEA RRNA SPEC QL NAA+PROBE: SIGNIFICANT CHANGE UP
NRBC # BLD: 0 /100 WBCS — SIGNIFICANT CHANGE UP (ref 0–0)
PHOSPHATE SERPL-MCNC: 3.5 MG/DL — SIGNIFICANT CHANGE UP (ref 2.5–4.5)
PLATELET # BLD AUTO: 236 K/UL — SIGNIFICANT CHANGE UP (ref 150–400)
POTASSIUM SERPL-MCNC: 4 MMOL/L — SIGNIFICANT CHANGE UP (ref 3.5–5.3)
POTASSIUM SERPL-SCNC: 4 MMOL/L — SIGNIFICANT CHANGE UP (ref 3.5–5.3)
PROT SERPL-MCNC: 7.1 G/DL — SIGNIFICANT CHANGE UP (ref 6–8.3)
RBC # BLD: 4.02 M/UL — SIGNIFICANT CHANGE UP (ref 3.8–5.2)
RBC # FLD: 12.4 % — SIGNIFICANT CHANGE UP (ref 10.3–14.5)
SODIUM SERPL-SCNC: 139 MMOL/L — SIGNIFICANT CHANGE UP (ref 135–145)
SPECIMEN SOURCE: SIGNIFICANT CHANGE UP
WBC # BLD: 4.78 K/UL — SIGNIFICANT CHANGE UP (ref 3.8–10.5)
WBC # FLD AUTO: 4.78 K/UL — SIGNIFICANT CHANGE UP (ref 3.8–10.5)

## 2024-10-16 PROCEDURE — 99233 SBSQ HOSP IP/OBS HIGH 50: CPT | Mod: GC

## 2024-10-16 RX ORDER — LACTULOSE 10 G/15 ML
10 SOLUTION, ORAL ORAL EVERY 12 HOURS
Refills: 0 | Status: DISCONTINUED | OUTPATIENT
Start: 2024-10-16 | End: 2024-10-16

## 2024-10-16 RX ORDER — MINERAL OIL 471.99 G/472ML
133 OIL TOPICAL ONCE
Refills: 0 | Status: COMPLETED | OUTPATIENT
Start: 2024-10-16 | End: 2024-10-16

## 2024-10-16 RX ORDER — LACTULOSE 10 G/15 ML
20 SOLUTION, ORAL ORAL EVERY 12 HOURS
Refills: 0 | Status: DISCONTINUED | OUTPATIENT
Start: 2024-10-16 | End: 2024-10-18

## 2024-10-16 RX ORDER — MORPHINE SULFATE 30 MG/1
4 TABLET, EXTENDED RELEASE ORAL EVERY 12 HOURS
Refills: 0 | Status: DISCONTINUED | OUTPATIENT
Start: 2024-10-17 | End: 2024-10-17

## 2024-10-16 RX ADMIN — KETOROLAC TROMETHAMINE 15 MILLIGRAM(S): 30 INJECTION INTRAMUSCULAR; INTRAVENOUS at 18:10

## 2024-10-16 RX ADMIN — MORPHINE SULFATE 4 MILLIGRAM(S): 30 TABLET, EXTENDED RELEASE ORAL at 04:18

## 2024-10-16 RX ADMIN — MORPHINE SULFATE 4 MILLIGRAM(S): 30 TABLET, EXTENDED RELEASE ORAL at 10:20

## 2024-10-16 RX ADMIN — Medication 20 GRAM(S): at 18:05

## 2024-10-16 RX ADMIN — MORPHINE SULFATE 4 MILLIGRAM(S): 30 TABLET, EXTENDED RELEASE ORAL at 17:02

## 2024-10-16 RX ADMIN — LIDOCAINE HYDROCHLORIDE 1 PATCH: 40 SOLUTION TOPICAL at 19:45

## 2024-10-16 RX ADMIN — POLYETHYLENE GLYCOL 3350 17 GRAM(S): 17 POWDER, FOR SOLUTION ORAL at 17:47

## 2024-10-16 RX ADMIN — ONDANSETRON HYDROCHLORIDE 4 MILLIGRAM(S): 2 INJECTION, SOLUTION INTRAMUSCULAR; INTRAVENOUS at 20:20

## 2024-10-16 RX ADMIN — KETOROLAC TROMETHAMINE 15 MILLIGRAM(S): 30 INJECTION INTRAMUSCULAR; INTRAVENOUS at 18:40

## 2024-10-16 RX ADMIN — Medication 2 TABLET(S): at 21:27

## 2024-10-16 RX ADMIN — MORPHINE SULFATE 4 MILLIGRAM(S): 30 TABLET, EXTENDED RELEASE ORAL at 05:18

## 2024-10-16 RX ADMIN — Medication 50 MICROGRAM(S): at 07:00

## 2024-10-16 RX ADMIN — POLYETHYLENE GLYCOL 3350 17 GRAM(S): 17 POWDER, FOR SOLUTION ORAL at 07:00

## 2024-10-16 RX ADMIN — ONDANSETRON HYDROCHLORIDE 4 MILLIGRAM(S): 2 INJECTION, SOLUTION INTRAMUSCULAR; INTRAVENOUS at 12:28

## 2024-10-16 RX ADMIN — MORPHINE SULFATE 4 MILLIGRAM(S): 30 TABLET, EXTENDED RELEASE ORAL at 16:32

## 2024-10-16 RX ADMIN — Medication 10 MILLIGRAM(S): at 15:40

## 2024-10-16 RX ADMIN — LIDOCAINE HYDROCHLORIDE 1 PATCH: 40 SOLUTION TOPICAL at 12:27

## 2024-10-16 RX ADMIN — MORPHINE SULFATE 4 MILLIGRAM(S): 30 TABLET, EXTENDED RELEASE ORAL at 10:50

## 2024-10-16 RX ADMIN — MINERAL OIL 133 MILLILITER(S): 471.99 OIL TOPICAL at 13:48

## 2024-10-16 NOTE — PROGRESS NOTE ADULT - PROBLEM SELECTOR PLAN 3
Pt with history of 3-4 years chronic constipation, has had colonoscopy 3 years ago with no abnormal findings was diagnosed with IBS-c. Pt reports having scant, soft/watery stools every other day, last "normal" BM was 2 weeks ago and the one prior was 2 months ago, these BMs are hard stools.    Plan:  - bowel regimen as stated above  - monitor stool counts

## 2024-10-16 NOTE — DISCHARGE NOTE PROVIDER - NSDCCPCAREPLAN_GEN_ALL_CORE_FT
PRINCIPAL DISCHARGE DIAGNOSIS  Diagnosis: Abdominal pain  Assessment and Plan of Treatment: You were admitted to the hospital for abdominal pain.     PRINCIPAL DISCHARGE DIAGNOSIS  Diagnosis: Abdominal pain  Assessment and Plan of Treatment: You were admitted to the hospital for abdominal pain. You underwent extensive workup, including pelvic ultrasound, abdominal ultrasound, and CT scan. All of the imaging was negative.      SECONDARY DISCHARGE DIAGNOSES  Diagnosis: Chronic constipation  Assessment and Plan of Treatment:      PRINCIPAL DISCHARGE DIAGNOSIS  Diagnosis: Abdominal pain  Assessment and Plan of Treatment: You were admitted to the hospital for abdominal pain. You underwent extensive workup, including pelvic ultrasound, abdominal ultrasound, and CT scan. All of the imaging was negative. You were seen by urology and gynecology while admitted. After discussion with the specialists, we believe your symptoms are caused by interstitial cystitis.      SECONDARY DISCHARGE DIAGNOSES  Diagnosis: Chronic constipation  Assessment and Plan of Treatment:      PRINCIPAL DISCHARGE DIAGNOSIS  Diagnosis: Abdominal pain  Assessment and Plan of Treatment: You were admitted to the hospital for abdominal pain. You underwent extensive workup, including pelvic ultrasound, abdominal ultrasound, and CT scan. All of the imaging was negative. You were seen by urology and gynecology while admitted. After discussion with the specialists, we believe your symptoms are caused by interstitial cystitis, or painful bladder syndrome. It is a disorder with symptoms of mild to severe bladder pain and an urgent and/or frequent need to urinate. We are starting you on a medication called amitriptyline. You should take this once per day at bedtime. We are also going to send you home on a short course of antibiotics to cover any bacteria that may be worsening the interstitial cystitis. You should take macrobid 2x/day for 5 days total. We are also going to give you oxycodone 5mg, for which you can take every 6 hours as needed for abdominal pain. Please be sure to follow up with urology and your PCP. If you have any worsening of symptoms, please return to the ED.      SECONDARY DISCHARGE DIAGNOSES  Diagnosis: Chronic constipation  Assessment and Plan of Treatment: You were admitted to the hospital and found to have chronic constipation. You were seen by gastroenterology while admitted, who believe your symptoms are related to irribitable bowel syndrome - constipation type. This is a functional disorder of the gastrointestinal tract characterized by chronic abdominal pain and altered bowel habits. We would like you to follow up with the GI doctors here for long term management of your symptoms. In the meantime, please continue to take senna and dulcolax at bedtime to make sure you are having bowel movements. If your symptoms worsen, please return to the ED.     PRINCIPAL DISCHARGE DIAGNOSIS  Diagnosis: Abdominal pain  Assessment and Plan of Treatment: You were admitted to the hospital for abdominal pain. You underwent extensive workup, including pelvic ultrasound, abdominal ultrasound, and CT scan. All of the imaging was negative. You were seen by urology and gynecology while admitted. After discussion with the specialists, we believe your symptoms are caused by interstitial cystitis, or painful bladder syndrome. It is a disorder with symptoms of mild to severe bladder pain and an urgent and/or frequent need to urinate. We are starting you on a medication called amitriptyline. You should take this once per day at bedtime. We are also going to send you home on a short course of antibiotics to cover any bacteria that may be worsening the interstitial cystitis. It also has anti-inflammatory properties. You should take macrobid 2x/day for 5 days total. We are also going to give you oxycodone 5mg, for which you can take every 6 hours as needed for abdominal pain. Please be sure to follow up with urology and your PCP. If you have any worsening of symptoms, please return to the ED.      SECONDARY DISCHARGE DIAGNOSES  Diagnosis: Chronic constipation  Assessment and Plan of Treatment: You were admitted to the hospital and found to have chronic constipation. You were seen by gastroenterology while admitted, who believe your symptoms are related to irribitable bowel syndrome - constipation type. This is a functional disorder of the gastrointestinal tract characterized by chronic abdominal pain and altered bowel habits. We would like you to follow up with the GI doctors here for long term management of your symptoms. In the meantime, please continue to take senna and dulcolax at bedtime to make sure you are having bowel movements. If your symptoms worsen, please return to the ED.

## 2024-10-16 NOTE — DISCHARGE NOTE PROVIDER - PROVIDER TOKENS
PROVIDER:[TOKEN:[[id number]],SCHEDULEDAPPT:[10/24/2024],SCHEDULEDAPPTTIME:[01:30 PM],ESTABLISHEDPATIENT:[T]]

## 2024-10-16 NOTE — DISCHARGE NOTE PROVIDER - HOSPITAL COURSE
#Discharge: do not delete    Patient is __ yo M/F with past medical history of _____ presented with _____, found to have _____ (one liner)    Hospital course (by problem):     Patient was discharged to: (home/[facility]/acute rehab/hospice, etc, and with what services – home health PT/RN? Home O2?)    New medications:   Changes to old medications:  Medications that were stopped:    Items to follow up as outpatient:    Physical exam at the time of discharge:       #Discharge: do not delete    Patient is 48F w/ PMH of hypothyroid, celiac disease, appendectomy and chronic constipation, who is presenting with 2 months of severe lower abdominal pain and urinary urgency.    Hospital course (by problem):     Patient was discharged to: (home/[facility]/acute rehab/hospice, etc, and with what services – home health PT/RN? Home O2?)    New medications:   Changes to old medications:  Medications that were stopped:    Items to follow up as outpatient:    Physical exam at the time of discharge:       #Discharge: do not delete    Patient is 48F w/ PMH of hypothyroid, celiac disease, appendectomy and chronic constipation, who is presenting with 2 months of severe lower abdominal pain and urinary urgency.    Hospital course (by problem):     Patient was discharged to: home    New medications:   Changes to old medications: none  Medications that were stopped: none    Items to follow up as outpatient: PCP, urology    Physical exam at the time of discharge:  General: NAD  HEENT: PERRL, EOM, anicteric sclera, MMM  Cardiovascular: +S1/S2; RRR; no M/R/G  Respiratory: CTAB; no W/R/R  Gastrointestinal: soft, NT/ND; +BS x4  Extremities: WWP; 2+ peripheral pulses bilaterally; no LE edema  Skin: normal color and turgor; no rash  Neurologic: AOx3, no focal deficits         Patient is 48F w/ PMH of hypothyroid, celiac disease, appendectomy and chronic constipation, who is presenting with 2 months of severe lower abdominal pain and urinary urgency. Pt reports that since late August 2024, patient has had severe 10/10 lower abdominal pain L>R that radiates up towards the ribcage and chest. Pt was treated with cefuroxime, ciprofloxacin, and levofloxacin for UTI just prior to admission. Pt underwent extensive imaging workup, including abdominal u/s, transvaginal u/s, CT abdomen/pelvis, all of which were negative. Urology was consulted, did not have any acute recommendations. Recommended trying ditropan and pyridium. Pt refused ditropan, s/p 2 days of pyridium. Pt's pain control was a major area of concern while admitted. She was initially on IV morphine q8hr, pain was not well controlled weaning off. She was eventually transitioned to oral oxycodone with adequate pain control. Pt was found to have large stool burden iso chronic constipation. She was eventually able to have good BMs s/p Miralax, senna, lactulose, enemas, suppositories, Golytely x2. GI was consulted, stated her sxs may be related to opioid bowel syndrome or IBS-C.  Pt is also postmenopausal, may have a component of vaginal atrophy causing her pelvic pain, gyn consulted, no acute interventions required. After discussion with specialists, pt's abdominal pain is believed to be due to interstitial cystitis and IBS-C. UA is ?positive with pos nitrites and leuk esterase, but no bacteria. Will send home on macrobid 100mg BID x 5d. She should be d/c on amitriptyline and f/u with urology, GI, and her PCP.    Hospital course (by problem):     #Abdominal pain.   Pt p/w 2 months of severe lower abdominal pain L>R, radiating towards chest iso chronic constipation as well as concurrent urinary urgency related pain, w/ associated n/v, lightheadedness, and joint pains. CTAP shows stool burden, however patient reports that pain has persisted despite aggressive bowel regimen leading to liquid bowel movements. Patient reports pain associated with urinary frequency and urgency, similar to UTI although has been tested multiple times during this admission for UTI and has been negative. Most recent now is ?positive with pos nitrites and leuk esterase but no bacteria.  Less likely abdominal pain 2/2 ovarian torsion, ectopic pregnancy, diverticulitis, GI obstruction given negative imaging and labs.   Repeat abd XRs showing nonobstructive bowel gas pattern. ISTOP reviewed.   Pt's abd pain is likely a mixed picture of IBS-C and interstitial cystitis.   Plan:  - F/u GI, urology  - pain regimen: heat and ice packs; tylenol 650mg q8 PRN for mild pain, oxycodone 5mg q6hrs prn for moderate to severe pain  - will send home on macrobid 100mg BID x 5d  - start amitriptyline 10mg qhs, pt should f/u with her PCP for titration of medication    #Urinary urgency.   Pt s/p menopause at age 42, has urinary urgency for past 2 months. Potential urinary dysfunction 2/2 chronic interstitial cystitis. UA is negative, pt is s/p 3 rounds of abx (cefuroxime, Ciprofloxacin, and levofloxacin). Less likely GYN related given negative labs and imaging.  GC neg, chlamydia neg.  S/p pyridium x2d, pt refused oxybutinin  - C/w pyridium, d/c oxybutynin as pt does not want it  - Urology consulted, should f/u outpatient with Dr. Goodson for urodynamics consultation  - GYN consulted, no acute interventions on their end, pain could be due to dysfunctional voiding    #Chronic constipation.   Pt with history of 3-4 years chronic constipation, has had colonoscopy 3 years ago with no abnormal findings was diagnosed with IBS-C. Pt reports having scant, soft/watery stools every other day, last "normal" BM was 2 weeks ago and the one prior was 2 months ago, these BMs are hard stools.  - bowel regimen as stated above  - pt should f/u outpatient for long term symptom control.    #Hypothyroid.   Pt on home synthroid 50mcg  - continue synthroid 50mcg.    #Celiac disease.   Pt with hx of Celiac disease, does not present with symptoms of Celiac flare  - continue to monitor symptoms, avoid gluten in diet.    Patient was discharged to: home    New medications: oxycodone, macrobid 100mg BID x 5d, amitriptyline 10mg  Changes to old medications: none  Medications that were stopped: none    Items to follow up as outpatient: PCP, urology, GI    Physical exam at the time of discharge:  General: NAD  HEENT: NC/AT; PERRL, anicteric sclera; MMM  Neck: supple  Cardiovascular: +S1/S2, RRR  Respiratory: CTA B/L; no W/R/R  Gastrointestinal:soft, improved distension, lower abdomen diffusely tender to palpation; +BSx4  Extremities: WWP; no edema, clubbing or cyanosis  Vascular: 2+ radial, DP/PT pulses B/L  Neurological: AAOx3; no focal deficits  Psychiatric: pleasant mood and affect  Dermatologic: no appreciable wounds or damage to the skin       Patient is 48F w/ PMH of hypothyroid, celiac disease, appendectomy and chronic constipation, who is presenting with 2 months of severe lower abdominal pain and urinary urgency. Pt reports that since late August 2024, patient has had severe 10/10 lower abdominal pain L>R that radiates up towards the ribcage and chest. Pt was treated with cefuroxime, ciprofloxacin, and levofloxacin for UTI just prior to admission. Pt underwent extensive imaging workup, including abdominal u/s, transvaginal u/s, CT abdomen/pelvis, all of which were negative. Urology was consulted, did not have any acute recommendations. Recommended trying ditropan and pyridium. Pt refused ditropan, s/p 2 days of pyridium. Pt's pain control was a major area of concern while admitted. She was initially on IV morphine q8hr, pain was not well controlled weaning off. She was eventually transitioned to oral oxycodone with adequate pain control. Pt was found to have large stool burden iso chronic constipation. She was eventually able to have good BMs s/p Miralax, senna, lactulose, enemas, suppositories, Golytely x2. GI was consulted, stated her sxs may be related to opioid bowel syndrome or IBS-C.  Pt is also postmenopausal, may have a component of vaginal atrophy causing her pelvic pain, gyn consulted, no acute interventions required. After discussion with specialists, pt's abdominal pain is believed to be due to interstitial cystitis and IBS-C. UA is ?positive with pos nitrites and leuk esterase, but no bacteria. Will send home on macrobid 100mg BID x 5d. She should be d/c on amitriptyline and f/u with urology, GI, and her PCP.    Hospital course (by problem):     # Abdominal pain   # IBS - Constipation   Pt p/w 2 months of severe lower abdominal pain L>R, radiating towards chest iso chronic constipation as well as concurrent urinary urgency related pain, w/ associated n/v, lightheadedness, and joint pains. CTAP shows stool burden, however patient reports that pain has persisted despite aggressive bowel regimen leading to liquid bowel movements. Patient reports pain associated with urinary frequency and urgency, similar to UTI although has been tested multiple times during this admission for UTI and has been negative. Most recent now is ?positive with pos nitrites and leuk esterase but no bacteria.  Less likely abdominal pain 2/2 ovarian torsion, ectopic pregnancy, diverticulitis, GI obstruction given negative imaging and labs.   Repeat abd XRs showing nonobstructive bowel gas pattern. ISTOP reviewed.   Pt's abd pain is likely a mixed picture of IBS-C and interstitial cystitis.   Plan:  - F/u GI, urology  - pain regimen: heat and ice packs; tylenol 650mg q8 PRN for mild pain, oxycodone 5mg q6hrs prn for moderate to severe pain  - will send home on macrobid 100mg BID x 5d  - start amitriptyline 10mg qhs, pt should f/u with her PCP for titration of medication    # Interstitial Cystitis   Pt s/p menopause at age 42, has urinary urgency for past 2 months. Potential urinary dysfunction 2/2 chronic interstitial cystitis. UA is negative, pt is s/p 3 rounds of abx (cefuroxime, Ciprofloxacin, and levofloxacin). Less likely GYN related given negative labs and imaging.  GC neg, chlamydia neg.  S/p pyridium x2d, pt refused oxybutinin  - C/w pyridium, d/c oxybutynin as pt does not want it  - Urology consulted, should f/u outpatient with Dr. Goodson for urodynamics consultation  - GYN consulted, no acute interventions on their end, pain could be due to dysfunctional voiding    # Chronic constipation.   Pt with history of 3-4 years chronic constipation, has had colonoscopy 3 years ago with no abnormal findings was diagnosed with IBS-C. Pt reports having scant, soft/watery stools every other day, last "normal" BM was 2 weeks ago and the one prior was 2 months ago, these BMs are hard stools.  - bowel regimen as stated above  - pt should f/u outpatient for long term symptom control.    # Hypothyroid.   Pt on home synthroid 50mcg  - continue synthroid 50mcg.    # Celiac disease.   Pt with hx of Celiac disease, does not present with symptoms of Celiac flare  - continue to monitor symptoms, avoid gluten in diet.    Patient was discharged to: home    New medications: oxycodone, Macrobid 100mg BID x 5d, amitriptyline 10mg  Changes to old medications: none  Medications that were stopped: none    Items to follow up as outpatient: PCP, urology, GI    Physical exam at the time of discharge:  General: NAD  HEENT: NC/AT; PERRL, anicteric sclera; MMM  Neck: supple  Cardiovascular: +S1/S2, RRR  Respiratory: CTA B/L; no W/R/R  Gastrointestinal: soft, improved distension, lower abdomen diffusely tender to palpation; +BSx4  Extremities: WWP; no edema, clubbing or cyanosis  Vascular: 2+ radial, DP/PT pulses B/L  Neurological: AAOx3; no focal deficits  Psychiatric: pleasant mood and affect  Dermatologic: no appreciable wounds or damage to the skin

## 2024-10-16 NOTE — DISCHARGE NOTE PROVIDER - NSDCFUSCHEDAPPT_GEN_ALL_CORE_FT
Sandeep Goodson  [facility]ician [facility]  UROLOGY [address]  Scheduled Appointment: 10/30/2024     Grant Delvalle  [facility] Physician [facility]  GASTRO [address]  Scheduled Appointment: 10/28/2024    Sandeep Goodson  [facility] Physician [facility]  UROLOGY [address]  Scheduled Appointment: 10/30/2024

## 2024-10-16 NOTE — DISCHARGE NOTE PROVIDER - NSDCMRMEDTOKEN_GEN_ALL_CORE_FT
Synthroid 50 mcg (0.05 mg) oral tablet: 1 tab(s) orally once a day   amitriptyline 10 mg oral tablet: 1 tab(s) orally once a day (at bedtime)  bisacodyl 5 mg oral delayed release tablet: 1 tab(s) orally every 12 hours  Macrobid 100 mg oral capsule: 1 cap(s) orally 2 times a day  senna leaf extract oral tablet: 2 tab(s) orally once a day (at bedtime)  Synthroid 50 mcg (0.05 mg) oral tablet: 1 tab(s) orally once a day   amitriptyline 10 mg oral tablet: 1 tab(s) orally once a day (at bedtime)  bisacodyl 5 mg oral delayed release tablet: 1 tab(s) orally every 12 hours  Macrobid 100 mg oral capsule: 1 cap(s) orally 2 times a day  oxyCODONE: 5 milligram(s) orally every 6 hours as needed for  severe pain  senna leaf extract oral tablet: 2 tab(s) orally once a day (at bedtime)  Synthroid 50 mcg (0.05 mg) oral tablet: 1 tab(s) orally once a day   amitriptyline 10 mg oral tablet: 1 tab(s) orally once a day (at bedtime)  bisacodyl 5 mg oral delayed release tablet: 1 tab(s) orally every 12 hours  Macrobid 100 mg oral capsule: 1 cap(s) orally 2 times a day  oxyCODONE: 5 milligram(s) orally every 6 hours as needed for  severe pain  Percocet 2.5 mg-325 mg oral tablet: 1 tab(s) orally every 6 hours as needed for  severe pain Take 1 -2 tablets for moderate to severe pain MDD: do not take more than 8 tablets a day  senna leaf extract oral tablet: 2 tab(s) orally once a day (at bedtime)  Synthroid 50 mcg (0.05 mg) oral tablet: 1 tab(s) orally once a day

## 2024-10-16 NOTE — PROGRESS NOTE ADULT - ASSESSMENT
48F w/ PMH of hypothyroid, celiac disease, appendectomy and chronic constipation, who is presenting with 2 months of severe lower abdominal pain and urinary urgency, with no abnormal findings on pelvic exam, transvaginal and pelvic US and CTAP, being admitted for management of severe abdominal pain 48F w/ PMH of hypothyroid, celiac disease, appendectomy and chronic constipation, who is presenting with 2 months of severe lower abdominal pain and urinary urgency, with no abnormal findings on pelvic exam, transvaginal and pelvic US and CTAP, admitted for management of severe abdominal pain.

## 2024-10-16 NOTE — PROGRESS NOTE ADULT - PROBLEM SELECTOR PLAN 6
F:   E: replete for K<4 and Mg<2   N: regular diet   V: lovenox F: None  E: replete for K<4 and Mg<2   N: regular diet   V: lovenox

## 2024-10-16 NOTE — PROGRESS NOTE ADULT - PROBLEM SELECTOR PLAN 2
Pt s/p menopause at age 42, has urinary urgency for past 2 months. Potential urinary dysfunction 2/2 chronic interstitial cystitis. UA is negative, pt is s/p 3 rounds of abx (cefuroxime, Ciprofloxacin, and levofloxacin). Less likely GYN related given negative labs and imaging.    Plan:  - urology consulted, appreciate recs in regards to possible interstitial cystitis as cause of pain   - f/u urine cultures  - f/u STI panel  - bladder scans q6 Pt s/p menopause at age 42, has urinary urgency for past 2 months. Potential urinary dysfunction 2/2 chronic interstitial cystitis. UA is negative, pt is s/p 3 rounds of abx (cefuroxime, Ciprofloxacin, and levofloxacin). Less likely GYN related given negative labs and imaging.    Plan:  - urology consulted, would benefit from urodynamics consultation --> should f/u with Dr. Goodson  - f/u STI panel  - bladder scans q6

## 2024-10-16 NOTE — DISCHARGE NOTE PROVIDER - CARE PROVIDER_API CALL
GONZALEZ BAGLEY  [address] [place] [address],  [facility]  Phone: [phone]  Fax: [phone]  Established Patient  Scheduled Appointment: 10/24/2024 01:30 PM

## 2024-10-16 NOTE — PROGRESS NOTE ADULT - SUBJECTIVE AND OBJECTIVE BOX
OVERNIGHT EVENTS:    SUBJECTIVE / INTERVAL HPI: Patient seen and examined at bedside.       PHYSICAL EXAM:    General: NAD  HEENT: NC/AT; PERRL, anicteric sclera; MMM  Neck: supple  Cardiovascular: +S1/S2, RRR  Respiratory: CTA B/L; no W/R/R  Gastrointestinal: soft, NT/ND; +BSx4  Extremities: WWP; no edema, clubbing or cyanosis  Vascular: 2+ radial, DP/PT pulses B/L  Neurological: AAOx3; no focal deficits  Psychiatric: pleasant mood and affect  Dermatologic: no appreciable wounds or damage to the skin    VITAL SIGNS:  Vital Signs Last 24 Hrs  T(C): 36.4 (16 Oct 2024 06:08), Max: 37.1 (15 Oct 2024 13:13)  T(F): 97.6 (16 Oct 2024 06:08), Max: 98.7 (15 Oct 2024 13:13)  HR: 62 (16 Oct 2024 06:08) (57 - 100)  BP: 118/82 (16 Oct 2024 06:08) (108/59 - 129/83)  BP(mean): 86 (16 Oct 2024 04:17) (86 - 86)  RR: 17 (16 Oct 2024 06:08) (17 - 19)  SpO2: 98% (16 Oct 2024 06:08) (98% - 100%)    Parameters below as of 16 Oct 2024 06:08  Patient On (Oxygen Delivery Method): room air          MEDICATIONS:  MEDICATIONS  (STANDING):  enoxaparin Injectable 40 milliGRAM(s) SubCutaneous every 24 hours  influenza   Vaccine 0.5 milliLiter(s) IntraMuscular once  levothyroxine 50 MICROGram(s) Oral every 24 hours  lidocaine   4% Patch 1 Patch Transdermal every 24 hours  polyethylene glycol 3350 17 Gram(s) Oral every 12 hours  senna 2 Tablet(s) Oral at bedtime    MEDICATIONS  (PRN):  acetaminophen     Tablet .. 650 milliGRAM(s) Oral every 6 hours PRN Temp greater or equal to 38C (100.4F), Mild Pain (1 - 3)  aluminum hydroxide/magnesium hydroxide/simethicone Suspension 30 milliLiter(s) Oral every 4 hours PRN Dyspepsia  ketorolac   Injectable 15 milliGRAM(s) IV Push every 6 hours PRN Moderate Pain (4 - 6)  melatonin 3 milliGRAM(s) Oral at bedtime PRN Insomnia  morphine  - Injectable 4 milliGRAM(s) IV Push every 6 hours PRN Severe Pain (7 - 10)  ondansetron Injectable 4 milliGRAM(s) IV Push every 8 hours PRN Nausea and/or Vomiting      ALLERGIES:  Allergies    sulfa drugs (Hives; Rash)  Augmentin (Hives; Rash)  Gluten (Diarrhea)    Intolerances        LABS:                        12.8   5.67  )-----------( 265      ( 15 Oct 2024 09:29 )             37.8     10-15    140  |  107  |  8   ----------------------------<  105[H]  4.8   |  23  |  0.80    Ca    9.4      15 Oct 2024 09:29    TPro  7.4  /  Alb  4.7  /  TBili  0.6  /  DBili  x   /  AST  14  /  ALT  8[L]  /  AlkPhos  78  10-15      Urinalysis Basic - ( 15 Oct 2024 10:15 )    Color: Yellow / Appearance: Clear / S.018 / pH: x  Gluc: x / Ketone: Negative mg/dL  / Bili: Negative / Urobili: 0.2 mg/dL   Blood: x / Protein: Negative mg/dL / Nitrite: Negative   Leuk Esterase: Negative / RBC: x / WBC x   Sq Epi: x / Non Sq Epi: x / Bacteria: x      CAPILLARY BLOOD GLUCOSE          RADIOLOGY & ADDITIONAL TESTS: Reviewed. OVERNIGHT EVENTS: PVR <10cc.     SUBJECTIVE / INTERVAL HPI: Patient seen and examined at bedside. Pt resting comfortably. States she still has immense abdominal pain/pressure, urinary frequency, and urinary hesitancy. States abd pain radiates up to her chest as well. Pt endorses one episode of NBNB emesis after dinner, which she attributes to drinking copious amount of water. Per pt, she has not had a good BM likely since January. Still has not had a BM while admitted. Denies fevers, chills, HA, SOB, d/c, hematuria, swelling in extremities. ROS otherwise negative.      PHYSICAL EXAM:    General: NAD  HEENT: NC/AT; PERRL, anicteric sclera; MMM  Neck: supple  Cardiovascular: +S1/S2, RRR  Respiratory: CTA B/L; no W/R/R  Gastrointestinal: moderately distended, grossly TTP in all 4 quadrants; +BSx4  Extremities: WWP; no edema, clubbing or cyanosis  Vascular: 2+ radial, DP/PT pulses B/L  Neurological: AAOx3; no focal deficits  Psychiatric: pleasant mood and affect  Dermatologic: no appreciable wounds or damage to the skin    VITAL SIGNS:  Vital Signs Last 24 Hrs  T(C): 36.4 (16 Oct 2024 06:08), Max: 37.1 (15 Oct 2024 13:13)  T(F): 97.6 (16 Oct 2024 06:08), Max: 98.7 (15 Oct 2024 13:13)  HR: 62 (16 Oct 2024 06:08) (57 - 100)  BP: 118/82 (16 Oct 2024 06:08) (108/59 - 129/83)  BP(mean): 86 (16 Oct 2024 04:17) (86 - 86)  RR: 17 (16 Oct 2024 06:08) (17 - 19)  SpO2: 98% (16 Oct 2024 06:08) (98% - 100%)    Parameters below as of 16 Oct 2024 06:08  Patient On (Oxygen Delivery Method): room air          MEDICATIONS:  MEDICATIONS  (STANDING):  enoxaparin Injectable 40 milliGRAM(s) SubCutaneous every 24 hours  influenza   Vaccine 0.5 milliLiter(s) IntraMuscular once  levothyroxine 50 MICROGram(s) Oral every 24 hours  lidocaine   4% Patch 1 Patch Transdermal every 24 hours  polyethylene glycol 3350 17 Gram(s) Oral every 12 hours  senna 2 Tablet(s) Oral at bedtime    MEDICATIONS  (PRN):  acetaminophen     Tablet .. 650 milliGRAM(s) Oral every 6 hours PRN Temp greater or equal to 38C (100.4F), Mild Pain (1 - 3)  aluminum hydroxide/magnesium hydroxide/simethicone Suspension 30 milliLiter(s) Oral every 4 hours PRN Dyspepsia  ketorolac   Injectable 15 milliGRAM(s) IV Push every 6 hours PRN Moderate Pain (4 - 6)  melatonin 3 milliGRAM(s) Oral at bedtime PRN Insomnia  morphine  - Injectable 4 milliGRAM(s) IV Push every 6 hours PRN Severe Pain (7 - 10)  ondansetron Injectable 4 milliGRAM(s) IV Push every 8 hours PRN Nausea and/or Vomiting      ALLERGIES:  Allergies    sulfa drugs (Hives; Rash)  Augmentin (Hives; Rash)  Gluten (Diarrhea)    Intolerances        LABS:                        12.8   5.67  )-----------( 265      ( 15 Oct 2024 09:29 )             37.8     10-15    140  |  107  |  8   ----------------------------<  105[H]  4.8   |  23  |  0.80    Ca    9.4      15 Oct 2024 09:29    TPro  7.4  /  Alb  4.7  /  TBili  0.6  /  DBili  x   /  AST  14  /  ALT  8[L]  /  AlkPhos  78  10-15      Urinalysis Basic - ( 15 Oct 2024 10:15 )    Color: Yellow / Appearance: Clear / S.018 / pH: x  Gluc: x / Ketone: Negative mg/dL  / Bili: Negative / Urobili: 0.2 mg/dL   Blood: x / Protein: Negative mg/dL / Nitrite: Negative   Leuk Esterase: Negative / RBC: x / WBC x   Sq Epi: x / Non Sq Epi: x / Bacteria: x      CAPILLARY BLOOD GLUCOSE          RADIOLOGY & ADDITIONAL TESTS: Reviewed.

## 2024-10-16 NOTE — DISCHARGE NOTE PROVIDER - DETAILS OF MALNUTRITION DIAGNOSIS/DIAGNOSES
This patient has been assessed with a concern for Malnutrition and was treated during this hospitalization for the following Nutrition diagnosis/diagnoses:     -  10/21/2024: Severe protein-calorie malnutrition

## 2024-10-16 NOTE — PROGRESS NOTE ADULT - PROBLEM SELECTOR PLAN 1
Pt p/w 2 months of severe lower abdominal pain L>R, radiating towards chest iso chronic constipation as well as concurrent urinary urgency related pain, w/ associated n/v, lightheadedness, and joint pains. CTAP shows stool burden, abdominal pain likely 2/2 chronic constipation, last "normal" BM was 2 weeks ago, pt has some scant BM every other day that are very soft/watery. Can consider chronic mesenteric ischemia if no improvement in pain despite BMs. Less likely abdominal pain 2/2 ovarian torsion, ectopic pregnancy, diverticulitis, GI obstruction given negative imaging and labs.     Plan:  - bowel regimen: Miralax BID, senna QHS, consider tap water enema if no BM in 24 hours, if no BM by tonight can offer suppository  - monitor stool counts  - pain regimen: lidocaine patches, heat and ice packs; tylenol 1g q8 PRN, Toradol 15mg IVP q6 PRN for moderate, morphine 4mg IVP q6 PRN for severe pain  - can consider up-titrating pain regimen if not adequately treating pain Pt p/w 2 months of severe lower abdominal pain L>R, radiating towards chest iso chronic constipation as well as concurrent urinary urgency related pain, w/ associated n/v, lightheadedness, and joint pains. CTAP shows stool burden, abdominal pain likely 2/2 chronic constipation, last "normal" BM was 2 weeks ago, pt has some scant BM every other day that are very soft/watery. Can consider chronic mesenteric ischemia if no improvement in pain despite BMs. Less likely abdominal pain 2/2 ovarian torsion, ectopic pregnancy, diverticulitis, GI obstruction given negative imaging and labs.     Plan:  - start lactulose 20mg BID  - trial of mineral oil enema  - c/w Miralax BID, senna QHS,  - monitor stool counts  - pain regimen: lidocaine patches, heat and ice packs; tylenol 1g q8 PRN, Toradol 15mg IVP q6 PRN for moderate, morphine 4mg IVP q6 PRN for severe pain  - can consider up-titrating pain regimen if not adequately treating pain

## 2024-10-17 PROBLEM — K90.0 CELIAC DISEASE: Chronic | Status: ACTIVE | Noted: 2024-10-15

## 2024-10-17 PROBLEM — K59.09 OTHER CONSTIPATION: Chronic | Status: ACTIVE | Noted: 2024-10-15

## 2024-10-17 LAB
A VAGINAE DNA VAG QL NAA+PROBE: SIGNIFICANT CHANGE UP
ANION GAP SERPL CALC-SCNC: 11 MMOL/L — SIGNIFICANT CHANGE UP (ref 5–17)
BUN SERPL-MCNC: 9 MG/DL — SIGNIFICANT CHANGE UP (ref 7–23)
BVAB2 DNA VAG QL NAA+PROBE: SIGNIFICANT CHANGE UP
C ALBICANS DNA VAG QL NAA+PROBE: NEGATIVE — SIGNIFICANT CHANGE UP
C GLABRATA DNA VAG QL NAA+PROBE: NEGATIVE — SIGNIFICANT CHANGE UP
CALCIUM SERPL-MCNC: 9.6 MG/DL — SIGNIFICANT CHANGE UP (ref 8.4–10.5)
CHLORIDE SERPL-SCNC: 104 MMOL/L — SIGNIFICANT CHANGE UP (ref 96–108)
CO2 SERPL-SCNC: 23 MMOL/L — SIGNIFICANT CHANGE UP (ref 22–31)
CREAT SERPL-MCNC: 0.85 MG/DL — SIGNIFICANT CHANGE UP (ref 0.5–1.3)
EGFR: 84 ML/MIN/1.73M2 — SIGNIFICANT CHANGE UP
GLUCOSE SERPL-MCNC: 84 MG/DL — SIGNIFICANT CHANGE UP (ref 70–99)
HCT VFR BLD CALC: 39 % — SIGNIFICANT CHANGE UP (ref 34.5–45)
HGB BLD-MCNC: 13.5 G/DL — SIGNIFICANT CHANGE UP (ref 11.5–15.5)
M GENITALIUM DNA SPEC QL NAA+PROBE: NEGATIVE — SIGNIFICANT CHANGE UP
M HOMINIS DNA SPEC QL NAA+PROBE: NEGATIVE — SIGNIFICANT CHANGE UP
MAGNESIUM SERPL-MCNC: 2.3 MG/DL — SIGNIFICANT CHANGE UP (ref 1.6–2.6)
MCHC RBC-ENTMCNC: 33.1 PG — SIGNIFICANT CHANGE UP (ref 27–34)
MCHC RBC-ENTMCNC: 34.6 GM/DL — SIGNIFICANT CHANGE UP (ref 32–36)
MCV RBC AUTO: 95.6 FL — SIGNIFICANT CHANGE UP (ref 80–100)
MEGA1 DNA VAG QL NAA+PROBE: SIGNIFICANT CHANGE UP
NRBC # BLD: 0 /100 WBCS — SIGNIFICANT CHANGE UP (ref 0–0)
PHOSPHATE SERPL-MCNC: 5.2 MG/DL — HIGH (ref 2.5–4.5)
PLATELET # BLD AUTO: 227 K/UL — SIGNIFICANT CHANGE UP (ref 150–400)
POTASSIUM SERPL-MCNC: 3.9 MMOL/L — SIGNIFICANT CHANGE UP (ref 3.5–5.3)
POTASSIUM SERPL-SCNC: 3.9 MMOL/L — SIGNIFICANT CHANGE UP (ref 3.5–5.3)
RBC # BLD: 4.08 M/UL — SIGNIFICANT CHANGE UP (ref 3.8–5.2)
RBC # FLD: 12.7 % — SIGNIFICANT CHANGE UP (ref 10.3–14.5)
SODIUM SERPL-SCNC: 138 MMOL/L — SIGNIFICANT CHANGE UP (ref 135–145)
T VAGINALIS RRNA SPEC QL NAA+PROBE: NEGATIVE — SIGNIFICANT CHANGE UP
UREAPLASMA DNA SPEC QL NAA+PROBE: NEGATIVE — SIGNIFICANT CHANGE UP
WBC # BLD: 4.38 K/UL — SIGNIFICANT CHANGE UP (ref 3.8–10.5)
WBC # FLD AUTO: 4.38 K/UL — SIGNIFICANT CHANGE UP (ref 3.8–10.5)

## 2024-10-17 PROCEDURE — 99233 SBSQ HOSP IP/OBS HIGH 50: CPT | Mod: GC

## 2024-10-17 PROCEDURE — 74019 RADEX ABDOMEN 2 VIEWS: CPT | Mod: 26

## 2024-10-17 RX ORDER — MORPHINE SULFATE 30 MG/1
4 TABLET, EXTENDED RELEASE ORAL EVERY 12 HOURS
Refills: 0 | Status: DISCONTINUED | OUTPATIENT
Start: 2024-10-17 | End: 2024-10-20

## 2024-10-17 RX ORDER — POLYETHYLENE GLYCOL 3350, SODIUM SULFATE ANHYDROUS, SODIUM BICARBONATE, SODIUM CHLORIDE, POTASSIUM CHLORIDE 236; 22.74; 6.74; 5.86; 2.97 G/4L; G/4L; G/4L; G/4L; G/4L
4000 POWDER, FOR SOLUTION ORAL ONCE
Refills: 0 | Status: COMPLETED | OUTPATIENT
Start: 2024-10-17 | End: 2024-10-17

## 2024-10-17 RX ORDER — MORPHINE SULFATE 30 MG/1
4 TABLET, EXTENDED RELEASE ORAL EVERY 12 HOURS
Refills: 0 | Status: DISCONTINUED | OUTPATIENT
Start: 2024-10-17 | End: 2024-10-17

## 2024-10-17 RX ORDER — MINERAL OIL 471.99 G/472ML
133 OIL TOPICAL ONCE
Refills: 0 | Status: COMPLETED | OUTPATIENT
Start: 2024-10-17 | End: 2024-10-17

## 2024-10-17 RX ORDER — MORPHINE SULFATE 30 MG/1
2 TABLET, EXTENDED RELEASE ORAL ONCE
Refills: 0 | Status: DISCONTINUED | OUTPATIENT
Start: 2024-10-17 | End: 2024-10-17

## 2024-10-17 RX ADMIN — ONDANSETRON HYDROCHLORIDE 4 MILLIGRAM(S): 2 INJECTION, SOLUTION INTRAMUSCULAR; INTRAVENOUS at 15:00

## 2024-10-17 RX ADMIN — LIDOCAINE HYDROCHLORIDE 1 PATCH: 40 SOLUTION TOPICAL at 18:09

## 2024-10-17 RX ADMIN — MORPHINE SULFATE 4 MILLIGRAM(S): 30 TABLET, EXTENDED RELEASE ORAL at 15:32

## 2024-10-17 RX ADMIN — Medication 50 MICROGRAM(S): at 06:57

## 2024-10-17 RX ADMIN — Medication 20 GRAM(S): at 17:47

## 2024-10-17 RX ADMIN — MORPHINE SULFATE 4 MILLIGRAM(S): 30 TABLET, EXTENDED RELEASE ORAL at 00:45

## 2024-10-17 RX ADMIN — LIDOCAINE HYDROCHLORIDE 1 PATCH: 40 SOLUTION TOPICAL at 11:10

## 2024-10-17 RX ADMIN — ONDANSETRON HYDROCHLORIDE 4 MILLIGRAM(S): 2 INJECTION, SOLUTION INTRAMUSCULAR; INTRAVENOUS at 22:29

## 2024-10-17 RX ADMIN — POLYETHYLENE GLYCOL 3350, SODIUM SULFATE ANHYDROUS, SODIUM BICARBONATE, SODIUM CHLORIDE, POTASSIUM CHLORIDE 4000 MILLILITER(S): 236; 22.74; 6.74; 5.86; 2.97 POWDER, FOR SOLUTION ORAL at 17:47

## 2024-10-17 RX ADMIN — Medication 20 GRAM(S): at 06:58

## 2024-10-17 RX ADMIN — MORPHINE SULFATE 2 MILLIGRAM(S): 30 TABLET, EXTENDED RELEASE ORAL at 09:50

## 2024-10-17 RX ADMIN — MORPHINE SULFATE 4 MILLIGRAM(S): 30 TABLET, EXTENDED RELEASE ORAL at 22:32

## 2024-10-17 RX ADMIN — Medication 2 TABLET(S): at 22:29

## 2024-10-17 RX ADMIN — LIDOCAINE HYDROCHLORIDE 1 PATCH: 40 SOLUTION TOPICAL at 23:39

## 2024-10-17 RX ADMIN — MORPHINE SULFATE 4 MILLIGRAM(S): 30 TABLET, EXTENDED RELEASE ORAL at 15:12

## 2024-10-17 RX ADMIN — MORPHINE SULFATE 2 MILLIGRAM(S): 30 TABLET, EXTENDED RELEASE ORAL at 09:34

## 2024-10-17 RX ADMIN — MORPHINE SULFATE 4 MILLIGRAM(S): 30 TABLET, EXTENDED RELEASE ORAL at 01:45

## 2024-10-17 RX ADMIN — MINERAL OIL 133 MILLILITER(S): 471.99 OIL TOPICAL at 11:11

## 2024-10-17 NOTE — PROGRESS NOTE ADULT - ASSESSMENT
48F w/ PMH of hypothyroid, celiac disease, appendectomy and chronic constipation, who is presenting with 2 months of severe lower abdominal pain and urinary urgency, with no abnormal findings on pelvic exam, transvaginal and pelvic US and CTAP, admitted for management of severe abdominal pain.

## 2024-10-17 NOTE — PROGRESS NOTE ADULT - SUBJECTIVE AND OBJECTIVE BOX
OVERNIGHT EVENTS:    SUBJECTIVE / INTERVAL HPI: Patient seen and examined at bedside.       PHYSICAL EXAM:    General: NAD  HEENT: NC/AT; PERRL, anicteric sclera; MMM  Neck: supple  Cardiovascular: +S1/S2, RRR  Respiratory: CTA B/L; no W/R/R  Gastrointestinal: soft, NT/ND; +BSx4  Extremities: WWP; no edema, clubbing or cyanosis  Vascular: 2+ radial, DP/PT pulses B/L  Neurological: AAOx3; no focal deficits  Psychiatric: pleasant mood and affect  Dermatologic: no appreciable wounds or damage to the skin    VITAL SIGNS:  Vital Signs Last 24 Hrs  T(C): 36.5 (17 Oct 2024 05:57), Max: 36.6 (16 Oct 2024 21:14)  T(F): 97.7 (17 Oct 2024 05:57), Max: 97.8 (16 Oct 2024 21:14)  HR: 68 (17 Oct 2024 05:57) (60 - 72)  BP: 114/69 (17 Oct 2024 05:57) (112/73 - 128/82)  BP(mean): --  RR: 18 (17 Oct 2024 05:57) (18 - 18)  SpO2: 100% (17 Oct 2024 05:57) (97% - 100%)    Parameters below as of 17 Oct 2024 05:57  Patient On (Oxygen Delivery Method): room air          MEDICATIONS:  MEDICATIONS  (STANDING):  enoxaparin Injectable 40 milliGRAM(s) SubCutaneous every 24 hours  influenza   Vaccine 0.5 milliLiter(s) IntraMuscular once  lactulose Syrup 20 Gram(s) Oral every 12 hours  levothyroxine 50 MICROGram(s) Oral every 24 hours  lidocaine   4% Patch 1 Patch Transdermal every 24 hours  polyethylene glycol 3350 17 Gram(s) Oral every 12 hours  senna 2 Tablet(s) Oral at bedtime    MEDICATIONS  (PRN):  acetaminophen     Tablet .. 650 milliGRAM(s) Oral every 6 hours PRN Temp greater or equal to 38C (100.4F), Mild Pain (1 - 3)  aluminum hydroxide/magnesium hydroxide/simethicone Suspension 30 milliLiter(s) Oral every 4 hours PRN Dyspepsia  melatonin 3 milliGRAM(s) Oral at bedtime PRN Insomnia  morphine  - Injectable 4 milliGRAM(s) IV Push every 12 hours PRN Severe Pain (7 - 10)  ondansetron Injectable 4 milliGRAM(s) IV Push every 8 hours PRN Nausea and/or Vomiting      ALLERGIES:  Allergies    sulfa drugs (Hives; Rash)  Augmentin (Hives; Rash)  Gluten (Diarrhea)    Intolerances        LABS:                        12.8   4.78  )-----------( 236      ( 16 Oct 2024 12:53 )             37.7     10-16    139  |  102  |  8   ----------------------------<  87  4.0   |  24  |  0.81    Ca    9.6      16 Oct 2024 12:53  Phos  3.5     10-16  Mg     2.1     10-16    TPro  7.1  /  Alb  4.4  /  TBili  0.7  /  DBili  x   /  AST  13  /  ALT  8[L]  /  AlkPhos  70  10-16      Urinalysis Basic - ( 16 Oct 2024 12:53 )    Color: x / Appearance: x / SG: x / pH: x  Gluc: 87 mg/dL / Ketone: x  / Bili: x / Urobili: x   Blood: x / Protein: x / Nitrite: x   Leuk Esterase: x / RBC: x / WBC x   Sq Epi: x / Non Sq Epi: x / Bacteria: x      CAPILLARY BLOOD GLUCOSE          RADIOLOGY & ADDITIONAL TESTS: Reviewed. OVERNIGHT EVENTS: Pt c/o severe abd pain, given morphine 4mg IV.    SUBJECTIVE / INTERVAL HPI: Patient seen and examined at bedside. Pt states she had one episode of diarrhea since last night, though not a large volume. Stated it was mostly clear liquid. Says that she believes the morphine is helping her to use the bathroom, rather than making her more constipated. States she feels somewhat nauseous as well. Denies fevers, chills, HA, chest pain, SOB, n/v, swelling in her extremities. ROS otherwise negative.      PHYSICAL EXAM:    General: NAD  HEENT: NC/AT; PERRL, anicteric sclera; MMM  Neck: supple  Cardiovascular: +S1/S2, RRR  Respiratory: CTA B/L; no W/R/R  Gastrointestinal: distended abdomen, diffusely TTP, no rebound or guarding; +BSx4  Extremities: WWP; no edema, clubbing or cyanosis  Vascular: 2+ radial, DP/PT pulses B/L  Neurological: AAOx3; no focal deficits  Psychiatric: pleasant mood and affect  Dermatologic: no appreciable wounds or damage to the skin    VITAL SIGNS:  Vital Signs Last 24 Hrs  T(C): 36.5 (17 Oct 2024 05:57), Max: 36.6 (16 Oct 2024 21:14)  T(F): 97.7 (17 Oct 2024 05:57), Max: 97.8 (16 Oct 2024 21:14)  HR: 68 (17 Oct 2024 05:57) (60 - 72)  BP: 114/69 (17 Oct 2024 05:57) (112/73 - 128/82)  BP(mean): --  RR: 18 (17 Oct 2024 05:57) (18 - 18)  SpO2: 100% (17 Oct 2024 05:57) (97% - 100%)    Parameters below as of 17 Oct 2024 05:57  Patient On (Oxygen Delivery Method): room air          MEDICATIONS:  MEDICATIONS  (STANDING):  enoxaparin Injectable 40 milliGRAM(s) SubCutaneous every 24 hours  influenza   Vaccine 0.5 milliLiter(s) IntraMuscular once  lactulose Syrup 20 Gram(s) Oral every 12 hours  levothyroxine 50 MICROGram(s) Oral every 24 hours  lidocaine   4% Patch 1 Patch Transdermal every 24 hours  polyethylene glycol 3350 17 Gram(s) Oral every 12 hours  senna 2 Tablet(s) Oral at bedtime    MEDICATIONS  (PRN):  acetaminophen     Tablet .. 650 milliGRAM(s) Oral every 6 hours PRN Temp greater or equal to 38C (100.4F), Mild Pain (1 - 3)  aluminum hydroxide/magnesium hydroxide/simethicone Suspension 30 milliLiter(s) Oral every 4 hours PRN Dyspepsia  melatonin 3 milliGRAM(s) Oral at bedtime PRN Insomnia  morphine  - Injectable 4 milliGRAM(s) IV Push every 12 hours PRN Severe Pain (7 - 10)  ondansetron Injectable 4 milliGRAM(s) IV Push every 8 hours PRN Nausea and/or Vomiting      ALLERGIES:  Allergies    sulfa drugs (Hives; Rash)  Augmentin (Hives; Rash)  Gluten (Diarrhea)    Intolerances        LABS:                        12.8   4.78  )-----------( 236      ( 16 Oct 2024 12:53 )             37.7     10-16    139  |  102  |  8   ----------------------------<  87  4.0   |  24  |  0.81    Ca    9.6      16 Oct 2024 12:53  Phos  3.5     10-16  Mg     2.1     10-16    TPro  7.1  /  Alb  4.4  /  TBili  0.7  /  DBili  x   /  AST  13  /  ALT  8[L]  /  AlkPhos  70  10-16      Urinalysis Basic - ( 16 Oct 2024 12:53 )    Color: x / Appearance: x / SG: x / pH: x  Gluc: 87 mg/dL / Ketone: x  / Bili: x / Urobili: x   Blood: x / Protein: x / Nitrite: x   Leuk Esterase: x / RBC: x / WBC x   Sq Epi: x / Non Sq Epi: x / Bacteria: x      CAPILLARY BLOOD GLUCOSE          RADIOLOGY & ADDITIONAL TESTS: Reviewed.

## 2024-10-17 NOTE — PROGRESS NOTE ADULT - PROBLEM SELECTOR PLAN 1
Pt p/w 2 months of severe lower abdominal pain L>R, radiating towards chest iso chronic constipation as well as concurrent urinary urgency related pain, w/ associated n/v, lightheadedness, and joint pains. CTAP shows stool burden, abdominal pain likely 2/2 chronic constipation, last "normal" BM was 2 weeks ago, pt has some scant BM every other day that are very soft/watery. Can consider chronic mesenteric ischemia if no improvement in pain despite BMs. Less likely abdominal pain 2/2 ovarian torsion, ectopic pregnancy, diverticulitis, GI obstruction given negative imaging and labs.     Plan:  - c/w lactulose 20mg BID  - trial of mineral oil enema and suppository, consider manual disimpaction  - c/w Miralax BID, senna QHS  - monitor stool counts  - pain regimen: lidocaine patches, heat and ice packs; tylenol 1g q8 PRN, Toradol 15mg IVP q6 PRN for moderate, morphine 4mg IVP q12 PRN for severe pain  - can consider up-titrating pain regimen if not adequately treating pain

## 2024-10-17 NOTE — PROGRESS NOTE ADULT - PROBLEM SELECTOR PLAN 2
Pt s/p menopause at age 42, has urinary urgency for past 2 months. Potential urinary dysfunction 2/2 chronic interstitial cystitis. UA is negative, pt is s/p 3 rounds of abx (cefuroxime, Ciprofloxacin, and levofloxacin). Less likely GYN related given negative labs and imaging.  GC neg, chlamydia neg.    Plan:  - urology consulted, would benefit from urodynamics consultation --> should f/u with Dr. Goodson  - bladder scans q6

## 2024-10-18 LAB
ANION GAP SERPL CALC-SCNC: 11 MMOL/L — SIGNIFICANT CHANGE UP (ref 5–17)
BUN SERPL-MCNC: 10 MG/DL — SIGNIFICANT CHANGE UP (ref 7–23)
CALCIUM SERPL-MCNC: 9.7 MG/DL — SIGNIFICANT CHANGE UP (ref 8.4–10.5)
CHLORIDE SERPL-SCNC: 106 MMOL/L — SIGNIFICANT CHANGE UP (ref 96–108)
CO2 SERPL-SCNC: 25 MMOL/L — SIGNIFICANT CHANGE UP (ref 22–31)
CREAT SERPL-MCNC: 0.94 MG/DL — SIGNIFICANT CHANGE UP (ref 0.5–1.3)
EGFR: 74 ML/MIN/1.73M2 — SIGNIFICANT CHANGE UP
GLUCOSE SERPL-MCNC: 88 MG/DL — SIGNIFICANT CHANGE UP (ref 70–99)
HCT VFR BLD CALC: 39.5 % — SIGNIFICANT CHANGE UP (ref 34.5–45)
HGB BLD-MCNC: 13.7 G/DL — SIGNIFICANT CHANGE UP (ref 11.5–15.5)
MAGNESIUM SERPL-MCNC: 2.4 MG/DL — SIGNIFICANT CHANGE UP (ref 1.6–2.6)
MCHC RBC-ENTMCNC: 33.4 PG — SIGNIFICANT CHANGE UP (ref 27–34)
MCHC RBC-ENTMCNC: 34.7 GM/DL — SIGNIFICANT CHANGE UP (ref 32–36)
MCV RBC AUTO: 96.3 FL — SIGNIFICANT CHANGE UP (ref 80–100)
NRBC # BLD: 0 /100 WBCS — SIGNIFICANT CHANGE UP (ref 0–0)
PHOSPHATE SERPL-MCNC: 5.4 MG/DL — HIGH (ref 2.5–4.5)
PLATELET # BLD AUTO: 215 K/UL — SIGNIFICANT CHANGE UP (ref 150–400)
POTASSIUM SERPL-MCNC: 5.2 MMOL/L — SIGNIFICANT CHANGE UP (ref 3.5–5.3)
POTASSIUM SERPL-SCNC: 5.2 MMOL/L — SIGNIFICANT CHANGE UP (ref 3.5–5.3)
RBC # BLD: 4.1 M/UL — SIGNIFICANT CHANGE UP (ref 3.8–5.2)
RBC # FLD: 12.7 % — SIGNIFICANT CHANGE UP (ref 10.3–14.5)
SODIUM SERPL-SCNC: 142 MMOL/L — SIGNIFICANT CHANGE UP (ref 135–145)
WBC # BLD: 4.57 K/UL — SIGNIFICANT CHANGE UP (ref 3.8–10.5)
WBC # FLD AUTO: 4.57 K/UL — SIGNIFICANT CHANGE UP (ref 3.8–10.5)

## 2024-10-18 PROCEDURE — 99223 1ST HOSP IP/OBS HIGH 75: CPT | Mod: GC

## 2024-10-18 PROCEDURE — 74019 RADEX ABDOMEN 2 VIEWS: CPT | Mod: 26

## 2024-10-18 PROCEDURE — 99233 SBSQ HOSP IP/OBS HIGH 50: CPT | Mod: GC

## 2024-10-18 RX ORDER — MINERAL OIL 471.99 G/472ML
133 OIL TOPICAL ONCE
Refills: 0 | Status: DISCONTINUED | OUTPATIENT
Start: 2024-10-18 | End: 2024-10-18

## 2024-10-18 RX ORDER — POLYETHYLENE GLYCOL 3350, SODIUM SULFATE ANHYDROUS, SODIUM BICARBONATE, SODIUM CHLORIDE, POTASSIUM CHLORIDE 236; 22.74; 6.74; 5.86; 2.97 G/4L; G/4L; G/4L; G/4L; G/4L
4000 POWDER, FOR SOLUTION ORAL ONCE
Refills: 0 | Status: COMPLETED | OUTPATIENT
Start: 2024-10-18 | End: 2024-10-18

## 2024-10-18 RX ORDER — LIDOCAINE HYDROCHLORIDE 40 MG/ML
1 SOLUTION TOPICAL
Refills: 0 | Status: DISCONTINUED | OUTPATIENT
Start: 2024-10-18 | End: 2024-10-22

## 2024-10-18 RX ORDER — MORPHINE SULFATE 30 MG/1
2 TABLET, EXTENDED RELEASE ORAL ONCE
Refills: 0 | Status: DISCONTINUED | OUTPATIENT
Start: 2024-10-18 | End: 2024-10-18

## 2024-10-18 RX ORDER — ACETAMINOPHEN 500 MG
1000 TABLET ORAL ONCE
Refills: 0 | Status: COMPLETED | OUTPATIENT
Start: 2024-10-18 | End: 2024-10-18

## 2024-10-18 RX ORDER — METHYLNALTREXONE BROMIDE 12 MG/.6ML
12 INJECTION, SOLUTION SUBCUTANEOUS ONCE
Refills: 0 | Status: COMPLETED | OUTPATIENT
Start: 2024-10-18 | End: 2024-10-18

## 2024-10-18 RX ADMIN — METHYLNALTREXONE BROMIDE 12 MILLIGRAM(S): 12 INJECTION, SOLUTION SUBCUTANEOUS at 18:39

## 2024-10-18 RX ADMIN — LIDOCAINE HYDROCHLORIDE 1 PATCH: 40 SOLUTION TOPICAL at 18:01

## 2024-10-18 RX ADMIN — LIDOCAINE HYDROCHLORIDE 1 PATCH: 40 SOLUTION TOPICAL at 11:00

## 2024-10-18 RX ADMIN — MORPHINE SULFATE 2 MILLIGRAM(S): 30 TABLET, EXTENDED RELEASE ORAL at 14:15

## 2024-10-18 RX ADMIN — MORPHINE SULFATE 4 MILLIGRAM(S): 30 TABLET, EXTENDED RELEASE ORAL at 23:22

## 2024-10-18 RX ADMIN — LIDOCAINE HYDROCHLORIDE 1 APPLICATION(S): 40 SOLUTION TOPICAL at 18:36

## 2024-10-18 RX ADMIN — POLYETHYLENE GLYCOL 3350, SODIUM SULFATE ANHYDROUS, SODIUM BICARBONATE, SODIUM CHLORIDE, POTASSIUM CHLORIDE 4000 MILLILITER(S): 236; 22.74; 6.74; 5.86; 2.97 POWDER, FOR SOLUTION ORAL at 13:55

## 2024-10-18 RX ADMIN — MORPHINE SULFATE 4 MILLIGRAM(S): 30 TABLET, EXTENDED RELEASE ORAL at 00:09

## 2024-10-18 RX ADMIN — MORPHINE SULFATE 4 MILLIGRAM(S): 30 TABLET, EXTENDED RELEASE ORAL at 06:54

## 2024-10-18 RX ADMIN — MORPHINE SULFATE 2 MILLIGRAM(S): 30 TABLET, EXTENDED RELEASE ORAL at 13:55

## 2024-10-18 RX ADMIN — LIDOCAINE HYDROCHLORIDE 1 PATCH: 40 SOLUTION TOPICAL at 22:30

## 2024-10-18 RX ADMIN — Medication 1000 MILLIGRAM(S): at 17:50

## 2024-10-18 RX ADMIN — Medication 400 MILLIGRAM(S): at 17:24

## 2024-10-18 RX ADMIN — Medication 50 MICROGRAM(S): at 06:25

## 2024-10-18 RX ADMIN — Medication 5 MILLIGRAM(S): at 17:24

## 2024-10-18 RX ADMIN — Medication 20 GRAM(S): at 06:25

## 2024-10-18 NOTE — CONSULT NOTE ADULT - ATTENDING COMMENTS
Patient seen, examined, and discussed with Dr. Rios. Agree with above. 48F with a h/o hypothyroidism, celiac disease and dairy intolerance, appendicitis with laparoscopic appendectomy with reported lysis of adhesions who reports a chronic history of constipation associated with abdominal pain. CT scan shows no obstruction, there is diffuse fecal matter noted in bowel loops. GI consulted for abdominal pain and constipation management. The initial pain that she presented with appears to be consistent with IBS-C related. However, she is now in a cycle of increasing pain, while also getting increasing opiate pain medications, which could be the early stages of narcotic bowel syndrome. Would recommend a trial of methylnaltrexone and continuing gentle golytely. Would taper down and discontinue opiates as much as possible. Further, lactulose causes gaseous distension, which can contribute to her symptoms and in general is not an optimal laxative, so would discontinue. No role for endoscopic eval at this time.     Moises Curtis MD  Gastroenterology

## 2024-10-18 NOTE — PROGRESS NOTE ADULT - PROBLEM SELECTOR PLAN 3
Pt with history of 3-4 years chronic constipation, has had colonoscopy 3 years ago with no abnormal findings was diagnosed with IBS-c. Pt reports having scant, soft/watery stools every other day, last "normal" BM was 2 weeks ago and the one prior was 2 months ago, these BMs are hard stools.    Plan:  - bowel regimen as stated above  - monitor stool counts Pt with history of 3-4 years chronic constipation, has had colonoscopy 3 years ago with no abnormal findings was diagnosed with IBS-C. Pt reports having scant, soft/watery stools every other day, last "normal" BM was 2 weeks ago and the one prior was 2 months ago, these BMs are hard stools.    Plan:  - bowel regimen as stated above  - monitor stool counts  - pt should f/u outpatient for long term symptom control

## 2024-10-18 NOTE — CONSULT NOTE ADULT - SUBJECTIVE AND OBJECTIVE BOX
Consultation Requested by:    Patient is a 48y old  Female who presents with a chief complaint of severe abdominal pain (18 Oct 2024 06:33)    HPI:  Patient is a 48F w/ PMH of hypothyroid, celiac disease, appendectomy and chronic constipation, who is presenting with 2 months of severe lower abdominal pain and urinary urgency. Pt reports that since late August 2024, patient has had severe 10/10 lower abdominal pain L>R that radiates up towards the ribcage and chest. Pt states that pain is debilitating and she has not been able to live her life, pt states that she has had moments where she cannot fathom continuing to live in her current pain. Pt reports that along with the pain she has had urinary urgency and frequency, feeling like she not emptying her bladder. She has taken 1 week course of cefuroxime (9/16-9/23), ciprofloxacin (9/25-10/2) and levofloxacin (10/3-10/10), pt states she felt some mild improvement while taking levofloxacin but severe abdominal pain with urinary urgency has returned in the past 5 days. Pt states pain is associated with nausea/vomiting, lightheadedness and headaches 2/2 pain. Pt denies fevers/chills or burning with urination. Pt reports being sexually active, monogamous with episode of gential herpes 20-30years ago without recurrence. Of note, patient had a burst appendix in 2017 w/ appendectomy, pt states at the time course was complicated by acute liver injury w/ hepatomegaly. Pt believes that her being ill during time of appendectomy led to early onset menopause and patient has not had menstrual cycle since 2017 (age 42). Pt has been chronically constipated for 3-4 years, pt reports that last normal BM was 2 weeks and prior to that was 2 months. Her BM are usually 2 small, "wormy" like soft/watery stools, whereas her normal BM consist of very hard, rock like stools. Pt reports that she does have hemorrhoids. Of note, patient reports worsening of joint pains and difficulty walking when having these episodes of severe abdominal pain, pt has been worked up for RA by PCP with negative screening.     ED Course:  Vitals: 98F, /59, , RR 18 100% RA   Labs: WBC 5.67, Hgb 12.8, Na 140, K 4.8, BUN/Cr 9/0.8, AST/ALT 14/8, Lipase 21, HCG 1, UA negative  Imaging:   US transvaginal and pelvis - Uterus, B/L ovaries all WNL no cysts found  US Retroperitoneum - Normal renal ultrasound.  CT AP w/ IV contrast - No acute findings. No evidence of acute pyelonephritis.  Intervention: Tylenol PO x1, morphine 4mg IVP x1, Zofran x1,   Consults: Urology    (15 Oct 2024 15:42)    Gastroenterology HPI: Ms. Martinez is a 48 year old female with a past medical history of hypothyroidism, celiac disease and dairy intolerance, appendicitis with laparoscopic appendectomy with reported lysis of adhesions who reports a chronic history of constipation associated with abdominal pain. Patient reports that her abdominal pain is chronic intermittent with abdominal bloating and relieved with bowel movements. The patient reports she chronically has bristol class 1 stools, she reports she has infrequent large volume bowel movements, last reported 1.5 weeks ago. She presented with this abdominal pain with CT scan showing large stool burden.     Pateint reports previous treatment with laxatives without improvement in symptoms.      Allergies    sulfa drugs (Hives; Rash)  Augmentin (Hives; Rash)  Gluten (Diarrhea)    Intolerances      Antimicrobials:      Other Medications:  acetaminophen     Tablet .. 650 milliGRAM(s) Oral every 6 hours PRN  aluminum hydroxide/magnesium hydroxide/simethicone Suspension 30 milliLiter(s) Oral every 4 hours PRN  bisacodyl Suppository 10 milliGRAM(s) Rectal <User Schedule>  enoxaparin Injectable 40 milliGRAM(s) SubCutaneous every 24 hours  influenza   Vaccine 0.5 milliLiter(s) IntraMuscular once  levothyroxine 50 MICROGram(s) Oral every 24 hours  lidocaine   4% Patch 1 Patch Transdermal every 24 hours  lidocaine 2% Gel 1 Application(s) Topical five times a day PRN  melatonin 3 milliGRAM(s) Oral at bedtime PRN  morphine  - Injectable 4 milliGRAM(s) IV Push every 12 hours  ondansetron Injectable 4 milliGRAM(s) IV Push every 8 hours PRN  polyethylene glycol 3350 17 Gram(s) Oral every 12 hours  senna 2 Tablet(s) Oral at bedtime      FAMILY HISTORY:    PAST MEDICAL & SURGICAL HISTORY:  Thyroid disorder      Celiac disease      Chronic constipation      History of appendectomy        .  VITAL SIGNS:  T(C): 37.3 (10-18-24 @ 11:43), Max: 37.3 (10-18-24 @ 11:43)  T(F): 99.1 (10-18-24 @ 11:43), Max: 99.1 (10-18-24 @ 11:43)  HR: 69 (10-18-24 @ 11:43) (69 - 79)  BP: 111/73 (10-18-24 @ 11:43) (111/73 - 128/87)  BP(mean): --  RR: 17 (10-18-24 @ 11:43) (17 - 17)  SpO2: 100% (10-18-24 @ 11:43) (98% - 100%)  Wt(kg): --    PHYSICAL EXAM:  Constitutional: Patient is in no acute distress, resting comfortably in bed.  HEENT: Atraumatic/normocephalic. mucous membranes moist.   Respiratory: Clear to auscultation bilaterally; no Wheezing/Crackles/Ronchi.  Cardiac: Regular rate and rhythm, S1/S2.  Gastrointestinal: abdomen soft, diffusely tender and non-distended; No guarding.   Extremities: Warm and Well perfused, no clubbing or cyanosis; no peripheral edema  Vascular: 2+ radial, Dorsalis pedis and posterior tibial pulses bilaterally.  Neurologic: AAOx3;     Lab Results:                        13.7   4.57  )-----------( 215      ( 18 Oct 2024 05:30 )             39.5     10-18    142  |  106  |  10  ----------------------------<  88  5.2   |  25  |  0.94    Ca    9.7      18 Oct 2024 05:30  Phos  5.4     10-18  Mg     2.4     10-18          Urinalysis Basic - ( 18 Oct 2024 05:30 )    Color: x / Appearance: x / SG: x / pH: x  Gluc: 88 mg/dL / Ketone: x  / Bili: x / Urobili: x   Blood: x / Protein: x / Nitrite: x   Leuk Esterase: x / RBC: x / WBC x   Sq Epi: x / Non Sq Epi: x / Bacteria: x

## 2024-10-18 NOTE — PROGRESS NOTE ADULT - SUBJECTIVE AND OBJECTIVE BOX
OVERNIGHT EVENTS:    SUBJECTIVE / INTERVAL HPI: Patient seen and examined at bedside.       PHYSICAL EXAM:    General: NAD  HEENT: NC/AT; PERRL, anicteric sclera; MMM  Neck: supple  Cardiovascular: +S1/S2, RRR  Respiratory: CTA B/L; no W/R/R  Gastrointestinal: soft, NT/ND; +BSx4  Extremities: WWP; no edema, clubbing or cyanosis  Vascular: 2+ radial, DP/PT pulses B/L  Neurological: AAOx3; no focal deficits  Psychiatric: pleasant mood and affect  Dermatologic: no appreciable wounds or damage to the skin    VITAL SIGNS:  Vital Signs Last 24 Hrs  T(C): 36.3 (18 Oct 2024 05:57), Max: 36.9 (17 Oct 2024 11:50)  T(F): 97.4 (18 Oct 2024 05:57), Max: 98.4 (17 Oct 2024 11:50)  HR: 77 (18 Oct 2024 05:57) (77 - 79)  BP: 118/74 (18 Oct 2024 05:57) (117/88 - 128/87)  BP(mean): --  RR: 17 (18 Oct 2024 05:57) (17 - 17)  SpO2: 98% (18 Oct 2024 05:57) (98% - 100%)    Parameters below as of 18 Oct 2024 05:57  Patient On (Oxygen Delivery Method): room air          MEDICATIONS:  MEDICATIONS  (STANDING):  enoxaparin Injectable 40 milliGRAM(s) SubCutaneous every 24 hours  influenza   Vaccine 0.5 milliLiter(s) IntraMuscular once  lactulose Syrup 20 Gram(s) Oral every 12 hours  levothyroxine 50 MICROGram(s) Oral every 24 hours  lidocaine   4% Patch 1 Patch Transdermal every 24 hours  morphine  - Injectable 4 milliGRAM(s) IV Push every 12 hours  polyethylene glycol 3350 17 Gram(s) Oral every 12 hours  senna 2 Tablet(s) Oral at bedtime    MEDICATIONS  (PRN):  acetaminophen     Tablet .. 650 milliGRAM(s) Oral every 6 hours PRN Temp greater or equal to 38C (100.4F), Mild Pain (1 - 3)  aluminum hydroxide/magnesium hydroxide/simethicone Suspension 30 milliLiter(s) Oral every 4 hours PRN Dyspepsia  melatonin 3 milliGRAM(s) Oral at bedtime PRN Insomnia  ondansetron Injectable 4 milliGRAM(s) IV Push every 8 hours PRN Nausea and/or Vomiting      ALLERGIES:  Allergies    sulfa drugs (Hives; Rash)  Augmentin (Hives; Rash)  Gluten (Diarrhea)    Intolerances        LABS:                        13.5   4.38  )-----------( 227      ( 17 Oct 2024 07:58 )             39.0     10-17    138  |  104  |  9   ----------------------------<  84  3.9   |  23  |  0.85    Ca    9.6      17 Oct 2024 07:58  Phos  5.2     10-17  Mg     2.3     10-17    TPro  7.1  /  Alb  4.4  /  TBili  0.7  /  DBili  x   /  AST  13  /  ALT  8[L]  /  AlkPhos  70  10-16      Urinalysis Basic - ( 17 Oct 2024 07:58 )    Color: x / Appearance: x / SG: x / pH: x  Gluc: 84 mg/dL / Ketone: x  / Bili: x / Urobili: x   Blood: x / Protein: x / Nitrite: x   Leuk Esterase: x / RBC: x / WBC x   Sq Epi: x / Non Sq Epi: x / Bacteria: x      CAPILLARY BLOOD GLUCOSE          RADIOLOGY & ADDITIONAL TESTS: Reviewed. OVERNIGHT EVENTS: Pt finished 1L Golytely.    SUBJECTIVE / INTERVAL HPI: Patient seen and examined at bedside. Pt resting comfortably. States that she had initially passed some diarrhea, then passing clear liquid. Still endorses moderate abdominal pain that radiates up to her chest/ribs. Pt thinks her abdominal distension is slightly better. Endorses continued urinary urgency. Denies fevers, chills, HA, chest pain, SOB, n/v, dysuria, swelling in extremities ROS otherwise negative.      PHYSICAL EXAM:    General: NAD  HEENT: NC/AT; PERRL, anicteric sclera; MMM  Neck: supple  Cardiovascular: +S1/S2, RRR  Respiratory: CTA B/L; no W/R/R  Gastrointestinal: distended abdomen, diffusely TTP, no rebound or guarding; +BSx4  Extremities: WWP; no edema, clubbing or cyanosis  Vascular: 2+ radial, DP/PT pulses B/L  Neurological: AAOx3; no focal deficits  Psychiatric: pleasant mood and affect  Dermatologic: no appreciable wounds or damage to the skin    VITAL SIGNS:  Vital Signs Last 24 Hrs  T(C): 36.3 (18 Oct 2024 05:57), Max: 36.9 (17 Oct 2024 11:50)  T(F): 97.4 (18 Oct 2024 05:57), Max: 98.4 (17 Oct 2024 11:50)  HR: 77 (18 Oct 2024 05:57) (77 - 79)  BP: 118/74 (18 Oct 2024 05:57) (117/88 - 128/87)  BP(mean): --  RR: 17 (18 Oct 2024 05:57) (17 - 17)  SpO2: 98% (18 Oct 2024 05:57) (98% - 100%)    Parameters below as of 18 Oct 2024 05:57  Patient On (Oxygen Delivery Method): room air          MEDICATIONS:  MEDICATIONS  (STANDING):  enoxaparin Injectable 40 milliGRAM(s) SubCutaneous every 24 hours  influenza   Vaccine 0.5 milliLiter(s) IntraMuscular once  lactulose Syrup 20 Gram(s) Oral every 12 hours  levothyroxine 50 MICROGram(s) Oral every 24 hours  lidocaine   4% Patch 1 Patch Transdermal every 24 hours  morphine  - Injectable 4 milliGRAM(s) IV Push every 12 hours  polyethylene glycol 3350 17 Gram(s) Oral every 12 hours  senna 2 Tablet(s) Oral at bedtime    MEDICATIONS  (PRN):  acetaminophen     Tablet .. 650 milliGRAM(s) Oral every 6 hours PRN Temp greater or equal to 38C (100.4F), Mild Pain (1 - 3)  aluminum hydroxide/magnesium hydroxide/simethicone Suspension 30 milliLiter(s) Oral every 4 hours PRN Dyspepsia  melatonin 3 milliGRAM(s) Oral at bedtime PRN Insomnia  ondansetron Injectable 4 milliGRAM(s) IV Push every 8 hours PRN Nausea and/or Vomiting      ALLERGIES:  Allergies    sulfa drugs (Hives; Rash)  Augmentin (Hives; Rash)  Gluten (Diarrhea)    Intolerances        LABS:                        13.5   4.38  )-----------( 227      ( 17 Oct 2024 07:58 )             39.0     10-17    138  |  104  |  9   ----------------------------<  84  3.9   |  23  |  0.85    Ca    9.6      17 Oct 2024 07:58  Phos  5.2     10-17  Mg     2.3     10-17    TPro  7.1  /  Alb  4.4  /  TBili  0.7  /  DBili  x   /  AST  13  /  ALT  8[L]  /  AlkPhos  70  10-16      Urinalysis Basic - ( 17 Oct 2024 07:58 )    Color: x / Appearance: x / SG: x / pH: x  Gluc: 84 mg/dL / Ketone: x  / Bili: x / Urobili: x   Blood: x / Protein: x / Nitrite: x   Leuk Esterase: x / RBC: x / WBC x   Sq Epi: x / Non Sq Epi: x / Bacteria: x      CAPILLARY BLOOD GLUCOSE          RADIOLOGY & ADDITIONAL TESTS: Reviewed.

## 2024-10-18 NOTE — CONSULT NOTE ADULT - ASSESSMENT
Ms. Martinez is a 48 year old female with a past medical history of hypothyroidism, celiac disease and dairy intolerance, appendicitis with laparoscopic appendectomy with reported lysis of adhesions who reports a chronic history of constipation associated with abdominal pain. CT scan shows no obstruction, there is diffuse fecal matter noted in bowel loops but no areas of     # Opiod Bowel Syndrome:       # Chronic Constipation: Likely in the setting of IBS-C. [place] IV criteria positive for recurrent abdominal pain in relation to bowel movements, with improvement after having BM, and >25% of stools with [place] class 1. Patient reports that she has  Ms. Martinez is a 48 year old female with a past medical history of hypothyroidism, celiac disease and dairy intolerance, appendicitis with laparoscopic appendectomy with reported lysis of adhesions who reports a chronic history of constipation associated with abdominal pain. CT scan shows no obstruction, there is diffuse fecal matter noted in bowel loops. GI consulted for abdominal pain and constipation management.     # Opioid Bowel Syndrome: Severe abdominal pain, notably patient has been having bowel movements and abdominal pain. Symptoms seem to be worsening over the course of the hospitalizations despite multiple bowel movements. At this time concerned for opiate effects associated with multiple laxatives administered. Patient has no evidence of obstruction on CT imaging and is freely having bowel movements.  - Recommend limiting opiate exposure.   - Recommend trial of methylnaltrexone given likely continued opiate use for pain control.   - Can continue with Golytly, gentle prep. Continue with oral hydration.  - Please discontinue Lactulose    # Chronic Constipation: Likely in the setting of IBS-C. [place] IV criteria positive for recurrent abdominal pain in relation to bowel movements, with improvement after having BM, and >25% of stools with [place] class 1. Patient reports that she has previously been treated with OTC laxatives with no effect. She has previously not tolerated SNRI therapy but is interested in possible re-attempt in future.   - At this time patient is having watery clear bowel movements, plan for outpatient follow up for long term symptom control.     Case discussed with DR. Moises Curtis.

## 2024-10-18 NOTE — PROGRESS NOTE ADULT - PROBLEM SELECTOR PLAN 1
Pt p/w 2 months of severe lower abdominal pain L>R, radiating towards chest iso chronic constipation as well as concurrent urinary urgency related pain, w/ associated n/v, lightheadedness, and joint pains. CTAP shows stool burden, abdominal pain likely 2/2 chronic constipation, last "normal" BM was 2 weeks ago, pt has some scant BM every other day that are very soft/watery. Can consider chronic mesenteric ischemia if no improvement in pain despite BMs. Less likely abdominal pain 2/2 ovarian torsion, ectopic pregnancy, diverticulitis, GI obstruction given negative imaging and labs.     Plan:  - c/w lactulose 20mg BID  - trial of mineral oil enema and suppository, consider manual disimpaction  - c/w Miralax BID, senna QHS  - monitor stool counts  - pain regimen: lidocaine patches, heat and ice packs; tylenol 1g q8 PRN, Toradol 15mg IVP q6 PRN for moderate, morphine 4mg IVP q12 PRN for severe pain  - can consider up-titrating pain regimen if not adequately treating pain Pt p/w 2 months of severe lower abdominal pain L>R, radiating towards chest iso chronic constipation as well as concurrent urinary urgency related pain, w/ associated n/v, lightheadedness, and joint pains. CTAP shows stool burden, abdominal pain likely 2/2 chronic constipation, last "normal" BM was 2 weeks ago, pt has some scant BM every other day that are very soft/watery. Can consider chronic mesenteric ischemia if no improvement in pain despite BMs. Less likely abdominal pain 2/2 ovarian torsion, ectopic pregnancy, diverticulitis, GI obstruction given negative imaging and labs.   Repeat abd XRs showing nonobstructive bowel gas pattern.  Pt with minimal BMs after dulcolax suppositories, mineral oil enema, manual disimpaction, 1L Golytely    Plan:  - GI consulted, pt's constipation is likely iso of IBS-C and exacerbated by opioids being given for pain control  - D/c lactulose 20mg  - Start methylnaltrexone subq iso continued opiate use for pain control   - Per GI, will give more Golytely  - Trial of tap water enema and suppositories  - c/w Miralax BID, senna QHS  - monitor stool counts  - pain regimen: lidocaine patches, heat and ice packs; tylenol 650mg q8 PRN, morphine 4mg IVP q12 for severe pain, can trial ofirmev

## 2024-10-19 PROCEDURE — 99232 SBSQ HOSP IP/OBS MODERATE 35: CPT | Mod: GC

## 2024-10-19 RX ORDER — PHENAZOPYRIDINE HCL 95 MG
200 TABLET ORAL EVERY 8 HOURS
Refills: 0 | Status: DISCONTINUED | OUTPATIENT
Start: 2024-10-19 | End: 2024-10-22

## 2024-10-19 RX ORDER — MORPHINE SULFATE 30 MG/1
4 TABLET, EXTENDED RELEASE ORAL ONCE
Refills: 0 | Status: DISCONTINUED | OUTPATIENT
Start: 2024-10-19 | End: 2024-10-19

## 2024-10-19 RX ORDER — ACETAMINOPHEN 500 MG
1000 TABLET ORAL ONCE
Refills: 0 | Status: COMPLETED | OUTPATIENT
Start: 2024-10-19 | End: 2024-10-19

## 2024-10-19 RX ORDER — OXYBUTYNIN CHLORIDE 5 MG/1
5 TABLET ORAL EVERY 12 HOURS
Refills: 0 | Status: DISCONTINUED | OUTPATIENT
Start: 2024-10-19 | End: 2024-10-21

## 2024-10-19 RX ADMIN — MORPHINE SULFATE 4 MILLIGRAM(S): 30 TABLET, EXTENDED RELEASE ORAL at 17:20

## 2024-10-19 RX ADMIN — MORPHINE SULFATE 4 MILLIGRAM(S): 30 TABLET, EXTENDED RELEASE ORAL at 09:25

## 2024-10-19 RX ADMIN — Medication 50 MICROGRAM(S): at 06:02

## 2024-10-19 RX ADMIN — LIDOCAINE HYDROCHLORIDE 1 PATCH: 40 SOLUTION TOPICAL at 12:43

## 2024-10-19 RX ADMIN — Medication 400 MILLIGRAM(S): at 12:44

## 2024-10-19 RX ADMIN — LIDOCAINE HYDROCHLORIDE 1 PATCH: 40 SOLUTION TOPICAL at 17:20

## 2024-10-19 RX ADMIN — MORPHINE SULFATE 4 MILLIGRAM(S): 30 TABLET, EXTENDED RELEASE ORAL at 22:22

## 2024-10-19 RX ADMIN — MORPHINE SULFATE 4 MILLIGRAM(S): 30 TABLET, EXTENDED RELEASE ORAL at 21:22

## 2024-10-19 RX ADMIN — Medication 200 MILLIGRAM(S): at 15:30

## 2024-10-19 RX ADMIN — MORPHINE SULFATE 4 MILLIGRAM(S): 30 TABLET, EXTENDED RELEASE ORAL at 16:26

## 2024-10-19 RX ADMIN — OXYBUTYNIN CHLORIDE 5 MILLIGRAM(S): 5 TABLET ORAL at 15:30

## 2024-10-19 RX ADMIN — Medication 2 TABLET(S): at 21:22

## 2024-10-19 RX ADMIN — MORPHINE SULFATE 4 MILLIGRAM(S): 30 TABLET, EXTENDED RELEASE ORAL at 08:37

## 2024-10-19 RX ADMIN — ONDANSETRON HYDROCHLORIDE 4 MILLIGRAM(S): 2 INJECTION, SOLUTION INTRAMUSCULAR; INTRAVENOUS at 16:26

## 2024-10-19 RX ADMIN — Medication 1000 MILLIGRAM(S): at 13:01

## 2024-10-19 NOTE — PROGRESS NOTE ADULT - PROBLEM SELECTOR PLAN 2
Pt s/p menopause at age 42, has urinary urgency for past 2 months. Potential urinary dysfunction 2/2 chronic interstitial cystitis. UA is negative, pt is s/p 3 rounds of abx (cefuroxime, Ciprofloxacin, and levofloxacin). Less likely GYN related given negative labs and imaging.  GC neg, chlamydia neg.    Plan:  - urology consulted, would benefit from urodynamics consultation --> should f/u with Dr. Goodson  - bladder scans q6 Pt s/p menopause at age 42, has urinary urgency for past 2 months. Potential urinary dysfunction 2/2 chronic interstitial cystitis. UA is negative, pt is s/p 3 rounds of abx (cefuroxime, Ciprofloxacin, and levofloxacin). Less likely GYN related given negative labs and imaging.  GC neg, chlamydia neg.    Plan:  - will trial pyridium and oxybutynin   - consider reconsulting urology for further recommendations  - would benefit from urodynamics consultation --> should f/u with Dr. Goodson  - bladder scans q6

## 2024-10-19 NOTE — PROGRESS NOTE ADULT - TIME BILLING
Bedside exam and interview   Reviewed vitals, labs, micro, cardiac testing, imaging  Discussed patient's plan of care with housestaff  Documentation of encounter.

## 2024-10-19 NOTE — PROGRESS NOTE ADULT - PROBLEM SELECTOR PLAN 3
Pt with history of 3-4 years chronic constipation, has had colonoscopy 3 years ago with no abnormal findings was diagnosed with IBS-C. Pt reports having scant, soft/watery stools every other day, last "normal" BM was 2 weeks ago and the one prior was 2 months ago, these BMs are hard stools.    Plan:  - bowel regimen as stated above  - monitor stool counts  - pt should f/u outpatient for long term symptom control

## 2024-10-19 NOTE — PROGRESS NOTE ADULT - PROBLEM SELECTOR PLAN 6
F: None  E: replete for K<4 and Mg<2   N: regular diet   V: lovenox F: None  E: replete for K<4 and Mg<2   N: gluten free diet  V: lovenox

## 2024-10-19 NOTE — PROGRESS NOTE ADULT - SUBJECTIVE AND OBJECTIVE BOX
OVERNIGHT EVENTS:    SUBJECTIVE / INTERVAL HPI: Patient seen and examined at bedside.       PHYSICAL EXAM:    General: NAD  HEENT: NC/AT; PERRL, anicteric sclera; MMM  Neck: supple  Cardiovascular: +S1/S2, RRR  Respiratory: CTA B/L; no W/R/R  Gastrointestinal: soft, NT/ND; +BSx4  Extremities: WWP; no edema, clubbing or cyanosis  Vascular: 2+ radial, DP/PT pulses B/L  Neurological: AAOx3; no focal deficits  Psychiatric: pleasant mood and affect  Dermatologic: no appreciable wounds or damage to the skin    VITAL SIGNS:  Vital Signs Last 24 Hrs  T(C): 36.6 (18 Oct 2024 22:30), Max: 37.3 (18 Oct 2024 11:43)  T(F): 97.9 (18 Oct 2024 22:30), Max: 99.1 (18 Oct 2024 11:43)  HR: 68 (18 Oct 2024 22:30) (68 - 69)  BP: 122/88 (18 Oct 2024 22:30) (111/73 - 122/88)  BP(mean): --  RR: 17 (18 Oct 2024 22:30) (17 - 17)  SpO2: 99% (18 Oct 2024 22:30) (99% - 100%)    Parameters below as of 18 Oct 2024 22:30  Patient On (Oxygen Delivery Method): room air          MEDICATIONS:  MEDICATIONS  (STANDING):  bisacodyl 5 milliGRAM(s) Oral every 12 hours  enoxaparin Injectable 40 milliGRAM(s) SubCutaneous every 24 hours  influenza   Vaccine 0.5 milliLiter(s) IntraMuscular once  levothyroxine 50 MICROGram(s) Oral every 24 hours  lidocaine   4% Patch 1 Patch Transdermal every 24 hours  morphine  - Injectable 4 milliGRAM(s) IV Push every 12 hours  polyethylene glycol 3350 17 Gram(s) Oral every 12 hours  senna 2 Tablet(s) Oral at bedtime    MEDICATIONS  (PRN):  acetaminophen     Tablet .. 650 milliGRAM(s) Oral every 6 hours PRN Temp greater or equal to 38C (100.4F), Mild Pain (1 - 3)  aluminum hydroxide/magnesium hydroxide/simethicone Suspension 30 milliLiter(s) Oral every 4 hours PRN Dyspepsia  lidocaine 2% Gel 1 Application(s) Topical five times a day PRN rectal pain  melatonin 3 milliGRAM(s) Oral at bedtime PRN Insomnia  ondansetron Injectable 4 milliGRAM(s) IV Push every 8 hours PRN Nausea and/or Vomiting      ALLERGIES:  Allergies    sulfa drugs (Hives; Rash)  Augmentin (Hives; Rash)  Gluten (Diarrhea)    Intolerances        LABS:                        13.7   4.57  )-----------( 215      ( 18 Oct 2024 05:30 )             39.5     10-18    142  |  106  |  10  ----------------------------<  88  5.2   |  25  |  0.94    Ca    9.7      18 Oct 2024 05:30  Phos  5.4     10-18  Mg     2.4     10-18        Urinalysis Basic - ( 18 Oct 2024 05:30 )    Color: x / Appearance: x / SG: x / pH: x  Gluc: 88 mg/dL / Ketone: x  / Bili: x / Urobili: x   Blood: x / Protein: x / Nitrite: x   Leuk Esterase: x / RBC: x / WBC x   Sq Epi: x / Non Sq Epi: x / Bacteria: x      CAPILLARY BLOOD GLUCOSE          RADIOLOGY & ADDITIONAL TESTS: Reviewed. OVERNIGHT EVENTS: Gave ofirmev for abd pain in AM.    SUBJECTIVE / INTERVAL HPI: Patient seen and examined at bedside. Pt states she still has abdominal pain despite adequate BMs. States she is having liquid diarrhea. Pt feels like her sxs are more related to her bladder, rather than constipation. Endorses urinary frequency and hesitency. Denies fevers, chills, HA, chest pain, SOB, n/v, swelling in extremities. ROS otherwise negative.      PHYSICAL EXAM:    General: NAD  HEENT: NC/AT; PERRL, anicteric sclera; MMM  Neck: supple  Cardiovascular: +S1/S2, RRR  Respiratory: CTA B/L; no W/R/R  Gastrointestinal: soft, improved distension, diffusely TTP; +BSx4  Extremities: WWP; no edema, clubbing or cyanosis  Vascular: 2+ radial, DP/PT pulses B/L  Neurological: AAOx3; no focal deficits  Psychiatric: pleasant mood and affect  Dermatologic: no appreciable wounds or damage to the skin    VITAL SIGNS:  Vital Signs Last 24 Hrs  T(C): 36.6 (18 Oct 2024 22:30), Max: 37.3 (18 Oct 2024 11:43)  T(F): 97.9 (18 Oct 2024 22:30), Max: 99.1 (18 Oct 2024 11:43)  HR: 68 (18 Oct 2024 22:30) (68 - 69)  BP: 122/88 (18 Oct 2024 22:30) (111/73 - 122/88)  BP(mean): --  RR: 17 (18 Oct 2024 22:30) (17 - 17)  SpO2: 99% (18 Oct 2024 22:30) (99% - 100%)    Parameters below as of 18 Oct 2024 22:30  Patient On (Oxygen Delivery Method): room air          MEDICATIONS:  MEDICATIONS  (STANDING):  bisacodyl 5 milliGRAM(s) Oral every 12 hours  enoxaparin Injectable 40 milliGRAM(s) SubCutaneous every 24 hours  influenza   Vaccine 0.5 milliLiter(s) IntraMuscular once  levothyroxine 50 MICROGram(s) Oral every 24 hours  lidocaine   4% Patch 1 Patch Transdermal every 24 hours  morphine  - Injectable 4 milliGRAM(s) IV Push every 12 hours  polyethylene glycol 3350 17 Gram(s) Oral every 12 hours  senna 2 Tablet(s) Oral at bedtime    MEDICATIONS  (PRN):  acetaminophen     Tablet .. 650 milliGRAM(s) Oral every 6 hours PRN Temp greater or equal to 38C (100.4F), Mild Pain (1 - 3)  aluminum hydroxide/magnesium hydroxide/simethicone Suspension 30 milliLiter(s) Oral every 4 hours PRN Dyspepsia  lidocaine 2% Gel 1 Application(s) Topical five times a day PRN rectal pain  melatonin 3 milliGRAM(s) Oral at bedtime PRN Insomnia  ondansetron Injectable 4 milliGRAM(s) IV Push every 8 hours PRN Nausea and/or Vomiting      ALLERGIES:  Allergies    sulfa drugs (Hives; Rash)  Augmentin (Hives; Rash)  Gluten (Diarrhea)    Intolerances        LABS:                        13.7   4.57  )-----------( 215      ( 18 Oct 2024 05:30 )             39.5     10-18    142  |  106  |  10  ----------------------------<  88  5.2   |  25  |  0.94    Ca    9.7      18 Oct 2024 05:30  Phos  5.4     10-18  Mg     2.4     10-18        Urinalysis Basic - ( 18 Oct 2024 05:30 )    Color: x / Appearance: x / SG: x / pH: x  Gluc: 88 mg/dL / Ketone: x  / Bili: x / Urobili: x   Blood: x / Protein: x / Nitrite: x   Leuk Esterase: x / RBC: x / WBC x   Sq Epi: x / Non Sq Epi: x / Bacteria: x      CAPILLARY BLOOD GLUCOSE          RADIOLOGY & ADDITIONAL TESTS: Reviewed. Interval:  Started pyridium and oxybutynin    SUBJECTIVE / INTERVAL HPI: Continues to endorse pressure from top of bladder leading to urinary frequency and hesitency. Reports flecks of blood in urine. Pain also now radiating to left flank occasionally. Reports pyridium and oxybutynin don't help and making it harder for her to urinate. Requesting pain medications be uptitrated. Still have liquid bowel movements ROS otherwise negative.      PHYSICAL EXAM:    General: NAD  HEENT: NC/AT; PERRL, anicteric sclera; MMM  Neck: supple  Cardiovascular: +S1/S2, RRR  Respiratory: CTA B/L; no W/R/R  Gastrointestinal: soft, improved distension, lower abdomen diffusely tender to palpation   Extremities: WWP; no edema, clubbing or cyanosis  Vascular: 2+ radial, DP/PT pulses B/L  Neurological: AAOx3; no focal deficits  Psychiatric: pleasant mood and affect  Dermatologic: no appreciable wounds or damage to the skin    Medications  acetaminophen     Tablet .. 650 milliGRAM(s) Oral every 6 hours PRN  aluminum hydroxide/magnesium hydroxide/simethicone Suspension 30 milliLiter(s) Oral every 4 hours PRN  bisacodyl 5 milliGRAM(s) Oral every 12 hours  enoxaparin Injectable 40 milliGRAM(s) SubCutaneous every 24 hours  HYDROmorphone  Injectable 0.5 milliGRAM(s) IV Push every 6 hours PRN  influenza   Vaccine 0.5 milliLiter(s) IntraMuscular once  levothyroxine 50 MICROGram(s) Oral every 24 hours  lidocaine   4% Patch 1 Patch Transdermal every 24 hours  lidocaine 2% Gel 1 Application(s) Topical five times a day PRN  melatonin 3 milliGRAM(s) Oral at bedtime PRN  ondansetron Injectable 4 milliGRAM(s) IV Push every 8 hours PRN  oxybutynin 5 milliGRAM(s) Oral every 12 hours  oxyCODONE    IR 5 milliGRAM(s) Oral every 8 hours PRN  oxyCODONE    IR 2.5 milliGRAM(s) Oral every 12 hours PRN  phenazopyridine 200 milliGRAM(s) Oral every 8 hours  polyethylene glycol 3350 17 Gram(s) Oral every 12 hours  senna 2 Tablet(s) Oral at bedtime      Allergies  sulfa drugs (Hives; Rash)  Augmentin (Hives; Rash)  Gluten (Diarrhea)      Vitals  T(C): 36.9 (10-20-24 @ 21:08), Max: 37.3 (10-20-24 @ 12:52)  HR: 77 (10-20-24 @ 21:08) (65 - 77)  BP: 127/87 (10-20-24 @ 21:08) (99/66 - 127/87)  RR: 17 (10-20-24 @ 21:08) (17 - 18)  SpO2: 96% (10-20-24 @ 21:08) (96% - 99%)    Intake/Output        Labs                        12.6   5.03  )-----------( 218      ( 20 Oct 2024 12:00 )             37.1         Urinalysis with Rflx Culture (collected 10-20-24 @ 09:08)

## 2024-10-20 LAB
APPEARANCE UR: CLEAR — SIGNIFICANT CHANGE UP
BACTERIA # UR AUTO: NEGATIVE /HPF — SIGNIFICANT CHANGE UP
BASOPHILS # BLD AUTO: 0.05 K/UL — SIGNIFICANT CHANGE UP (ref 0–0.2)
BASOPHILS NFR BLD AUTO: 1 % — SIGNIFICANT CHANGE UP (ref 0–2)
BILIRUB UR-MCNC: ABNORMAL
COLOR SPEC: SIGNIFICANT CHANGE UP
DIFF PNL FLD: NEGATIVE — SIGNIFICANT CHANGE UP
EOSINOPHIL # BLD AUTO: 0.16 K/UL — SIGNIFICANT CHANGE UP (ref 0–0.5)
EOSINOPHIL NFR BLD AUTO: 3.2 % — SIGNIFICANT CHANGE UP (ref 0–6)
GLUCOSE UR QL: NEGATIVE MG/DL — SIGNIFICANT CHANGE UP
HCT VFR BLD CALC: 37.1 % — SIGNIFICANT CHANGE UP (ref 34.5–45)
HGB BLD-MCNC: 12.6 G/DL — SIGNIFICANT CHANGE UP (ref 11.5–15.5)
IMM GRANULOCYTES NFR BLD AUTO: 0.2 % — SIGNIFICANT CHANGE UP (ref 0–0.9)
KETONES UR-MCNC: NEGATIVE MG/DL — SIGNIFICANT CHANGE UP
LEUKOCYTE ESTERASE UR-ACNC: ABNORMAL
LYMPHOCYTES # BLD AUTO: 1.72 K/UL — SIGNIFICANT CHANGE UP (ref 1–3.3)
LYMPHOCYTES # BLD AUTO: 34.2 % — SIGNIFICANT CHANGE UP (ref 13–44)
MCHC RBC-ENTMCNC: 32.2 PG — SIGNIFICANT CHANGE UP (ref 27–34)
MCHC RBC-ENTMCNC: 34 GM/DL — SIGNIFICANT CHANGE UP (ref 32–36)
MCV RBC AUTO: 94.9 FL — SIGNIFICANT CHANGE UP (ref 80–100)
MONOCYTES # BLD AUTO: 0.39 K/UL — SIGNIFICANT CHANGE UP (ref 0–0.9)
MONOCYTES NFR BLD AUTO: 7.8 % — SIGNIFICANT CHANGE UP (ref 2–14)
NEUTROPHILS # BLD AUTO: 2.7 K/UL — SIGNIFICANT CHANGE UP (ref 1.8–7.4)
NEUTROPHILS NFR BLD AUTO: 53.6 % — SIGNIFICANT CHANGE UP (ref 43–77)
NITRITE UR-MCNC: POSITIVE
NRBC # BLD: 0 /100 WBCS — SIGNIFICANT CHANGE UP (ref 0–0)
PH UR: 7 — SIGNIFICANT CHANGE UP (ref 5–8)
PLATELET # BLD AUTO: 218 K/UL — SIGNIFICANT CHANGE UP (ref 150–400)
PROT UR-MCNC: NEGATIVE MG/DL — SIGNIFICANT CHANGE UP
RBC # BLD: 3.91 M/UL — SIGNIFICANT CHANGE UP (ref 3.8–5.2)
RBC # FLD: 12.3 % — SIGNIFICANT CHANGE UP (ref 10.3–14.5)
RBC CASTS # UR COMP ASSIST: 1 /HPF — SIGNIFICANT CHANGE UP (ref 0–4)
SP GR SPEC: 1.01 — SIGNIFICANT CHANGE UP (ref 1–1.03)
SQUAMOUS # UR AUTO: 0 /HPF — SIGNIFICANT CHANGE UP (ref 0–5)
UROBILINOGEN FLD QL: 1 MG/DL — SIGNIFICANT CHANGE UP (ref 0.2–1)
WBC # BLD: 5.03 K/UL — SIGNIFICANT CHANGE UP (ref 3.8–10.5)
WBC # FLD AUTO: 5.03 K/UL — SIGNIFICANT CHANGE UP (ref 3.8–10.5)
WBC UR QL: 0 /HPF — SIGNIFICANT CHANGE UP (ref 0–5)

## 2024-10-20 RX ORDER — OXYCODONE HYDROCHLORIDE 30 MG/1
5 TABLET ORAL EVERY 4 HOURS
Refills: 0 | Status: DISCONTINUED | OUTPATIENT
Start: 2024-10-20 | End: 2024-10-20

## 2024-10-20 RX ORDER — HYDROMORPHONE HCL/0.9% NACL/PF 6 MG/30 ML
0.5 PATIENT CONTROLLED ANALGESIA SYRINGE INTRAVENOUS EVERY 6 HOURS
Refills: 0 | Status: DISCONTINUED | OUTPATIENT
Start: 2024-10-20 | End: 2024-10-21

## 2024-10-20 RX ORDER — OXYCODONE HYDROCHLORIDE 30 MG/1
5 TABLET ORAL EVERY 8 HOURS
Refills: 0 | Status: DISCONTINUED | OUTPATIENT
Start: 2024-10-20 | End: 2024-10-22

## 2024-10-20 RX ORDER — OXYCODONE HYDROCHLORIDE 30 MG/1
2.5 TABLET ORAL EVERY 4 HOURS
Refills: 0 | Status: DISCONTINUED | OUTPATIENT
Start: 2024-10-20 | End: 2024-10-20

## 2024-10-20 RX ORDER — OXYCODONE HYDROCHLORIDE 30 MG/1
2.5 TABLET ORAL EVERY 12 HOURS
Refills: 0 | Status: DISCONTINUED | OUTPATIENT
Start: 2024-10-20 | End: 2024-10-22

## 2024-10-20 RX ADMIN — Medication 2 TABLET(S): at 22:10

## 2024-10-20 RX ADMIN — Medication 50 MICROGRAM(S): at 06:40

## 2024-10-20 RX ADMIN — MORPHINE SULFATE 4 MILLIGRAM(S): 30 TABLET, EXTENDED RELEASE ORAL at 09:22

## 2024-10-20 RX ADMIN — Medication 0.5 MILLIGRAM(S): at 19:13

## 2024-10-20 RX ADMIN — OXYBUTYNIN CHLORIDE 5 MILLIGRAM(S): 5 TABLET ORAL at 06:41

## 2024-10-20 RX ADMIN — ONDANSETRON HYDROCHLORIDE 4 MILLIGRAM(S): 2 INJECTION, SOLUTION INTRAMUSCULAR; INTRAVENOUS at 07:29

## 2024-10-20 RX ADMIN — Medication 200 MILLIGRAM(S): at 06:40

## 2024-10-20 RX ADMIN — LIDOCAINE HYDROCHLORIDE 1 PATCH: 40 SOLUTION TOPICAL at 13:10

## 2024-10-20 RX ADMIN — Medication 0.5 MILLIGRAM(S): at 18:53

## 2024-10-20 RX ADMIN — LIDOCAINE HYDROCHLORIDE 1 PATCH: 40 SOLUTION TOPICAL at 17:24

## 2024-10-20 RX ADMIN — OXYCODONE HYDROCHLORIDE 5 MILLIGRAM(S): 30 TABLET ORAL at 13:09

## 2024-10-20 RX ADMIN — OXYCODONE HYDROCHLORIDE 5 MILLIGRAM(S): 30 TABLET ORAL at 22:09

## 2024-10-20 RX ADMIN — OXYCODONE HYDROCHLORIDE 5 MILLIGRAM(S): 30 TABLET ORAL at 14:04

## 2024-10-20 RX ADMIN — OXYCODONE HYDROCHLORIDE 2.5 MILLIGRAM(S): 30 TABLET ORAL at 21:00

## 2024-10-20 RX ADMIN — LIDOCAINE HYDROCHLORIDE 1 PATCH: 40 SOLUTION TOPICAL at 00:23

## 2024-10-20 RX ADMIN — MORPHINE SULFATE 4 MILLIGRAM(S): 30 TABLET, EXTENDED RELEASE ORAL at 08:37

## 2024-10-20 RX ADMIN — OXYCODONE HYDROCHLORIDE 2.5 MILLIGRAM(S): 30 TABLET ORAL at 22:09

## 2024-10-20 RX ADMIN — Medication 200 MILLIGRAM(S): at 13:09

## 2024-10-20 RX ADMIN — OXYCODONE HYDROCHLORIDE 5 MILLIGRAM(S): 30 TABLET ORAL at 23:00

## 2024-10-21 PROCEDURE — 99233 SBSQ HOSP IP/OBS HIGH 50: CPT | Mod: GC

## 2024-10-21 RX ORDER — ESTRADIOL 10 UG/1
0.5 TABLET VAGINAL EVERY 24 HOURS
Refills: 0 | Status: DISCONTINUED | OUTPATIENT
Start: 2024-10-21 | End: 2024-10-21

## 2024-10-21 RX ORDER — OXYCODONE HYDROCHLORIDE 30 MG/1
5 TABLET ORAL ONCE
Refills: 0 | Status: DISCONTINUED | OUTPATIENT
Start: 2024-10-21 | End: 2024-10-21

## 2024-10-21 RX ADMIN — LIDOCAINE HYDROCHLORIDE 1 PATCH: 40 SOLUTION TOPICAL at 01:19

## 2024-10-21 RX ADMIN — OXYCODONE HYDROCHLORIDE 5 MILLIGRAM(S): 30 TABLET ORAL at 22:47

## 2024-10-21 RX ADMIN — OXYCODONE HYDROCHLORIDE 5 MILLIGRAM(S): 30 TABLET ORAL at 14:44

## 2024-10-21 RX ADMIN — ONDANSETRON HYDROCHLORIDE 4 MILLIGRAM(S): 2 INJECTION, SOLUTION INTRAMUSCULAR; INTRAVENOUS at 10:10

## 2024-10-21 RX ADMIN — OXYCODONE HYDROCHLORIDE 2.5 MILLIGRAM(S): 30 TABLET ORAL at 11:07

## 2024-10-21 RX ADMIN — LIDOCAINE HYDROCHLORIDE 1 PATCH: 40 SOLUTION TOPICAL at 12:19

## 2024-10-21 RX ADMIN — OXYCODONE HYDROCHLORIDE 5 MILLIGRAM(S): 30 TABLET ORAL at 06:45

## 2024-10-21 RX ADMIN — OXYCODONE HYDROCHLORIDE 5 MILLIGRAM(S): 30 TABLET ORAL at 07:31

## 2024-10-21 RX ADMIN — Medication 200 MILLIGRAM(S): at 22:47

## 2024-10-21 RX ADMIN — OXYCODONE HYDROCHLORIDE 5 MILLIGRAM(S): 30 TABLET ORAL at 15:22

## 2024-10-21 RX ADMIN — OXYCODONE HYDROCHLORIDE 2.5 MILLIGRAM(S): 30 TABLET ORAL at 10:10

## 2024-10-21 RX ADMIN — LIDOCAINE HYDROCHLORIDE 1 PATCH: 40 SOLUTION TOPICAL at 17:21

## 2024-10-21 RX ADMIN — OXYCODONE HYDROCHLORIDE 5 MILLIGRAM(S): 30 TABLET ORAL at 12:19

## 2024-10-21 RX ADMIN — Medication 50 MICROGRAM(S): at 06:44

## 2024-10-21 RX ADMIN — OXYCODONE HYDROCHLORIDE 5 MILLIGRAM(S): 30 TABLET ORAL at 21:47

## 2024-10-21 RX ADMIN — OXYCODONE HYDROCHLORIDE 5 MILLIGRAM(S): 30 TABLET ORAL at 13:12

## 2024-10-21 NOTE — DIETITIAN INITIAL EVALUATION ADULT - PROBLEM SELECTOR PLAN 1
Pt p/w 2 months of severe lower abdominal pain L>R, radiating towards chest iso chronic constipation as well as concurrent urinary urgency related pain, w/ associated n/v, lightheadedness, and joint pains. CTAP shows stool burden, abdominal pain likely 2/2 chronic constipation, last "normal" BM was 2 weeks ago, pt has some scant BM every other day that are very soft/watery. Can consider chronic mesenteric ischemia if no improvement in pain despite BMs. Less likely abdominal pain 2/2 ovarian torsion, ectopic pregnancy, diverticulitis, GI obstruction given negative imaging and labs.     Plan:  - bowel regimen: Miralax BID, senna QHS, consider tap water enema if no BM in 24 hours, if no BM by tonight can offer suppository  - monitor stool counts  - pain regimen: lidocaine patches, heat and ice packs; tylenol 1g q8 PRN, Toradol 15mg IVP q6 PRN for moderate, morphine 4mg IVP q6 PRN for severe pain  - can consider up-titrating pain regimen if not adequately treating pain

## 2024-10-21 NOTE — PROGRESS NOTE ADULT - PROBLEM SELECTOR PLAN 1
Pt p/w 2 months of severe lower abdominal pain L>R, radiating towards chest iso chronic constipation as well as concurrent urinary urgency related pain, w/ associated n/v, lightheadedness, and joint pains. CTAP shows stool burden, however patient reports that pain has persisted despite aggressive bowel regimen leading to liquid bowel movements. Patient reports pain associated with urinary frequency and urgency, similar to UTI although has been tested multiple times during this admission for UTI and has been negative.  Less likely abdominal pain 2/2 ovarian torsion, ectopic pregnancy, diverticulitis, GI obstruction given negative imaging and labs.   Repeat abd XRs showing nonobstructive bowel gas pattern. ISTOP reviewed.       Plan:  - GI previously consulted, recommended methylnaltrexone and bowel regimen p  - pain regimen: lidocaine patches, heat and ice packs; tylenol 650mg q8 PRN, oxycodone 5mg q8hrs for mod pain, 2.5mg for mild pain, breakthrough IV dilaudid Pt p/w 2 months of severe lower abdominal pain L>R, radiating towards chest iso chronic constipation as well as concurrent urinary urgency related pain, w/ associated n/v, lightheadedness, and joint pains. CTAP shows stool burden, however patient reports that pain has persisted despite aggressive bowel regimen leading to liquid bowel movements. Patient reports pain associated with urinary frequency and urgency, similar to UTI although has been tested multiple times during this admission for UTI and has been negative.  Less likely abdominal pain 2/2 ovarian torsion, ectopic pregnancy, diverticulitis, GI obstruction given negative imaging and labs.   Repeat abd XRs showing nonobstructive bowel gas pattern. ISTOP reviewed.       Plan:  - GI previously consulted, recommended methylnaltrexone and bowel regimen  - pain regimen: lidocaine patches, heat and ice packs; tylenol 650mg q8 PRN, oxycodone 5mg q8hrs for mod pain, 2.5mg for mild pain

## 2024-10-21 NOTE — CHART NOTE - NSCHARTNOTEFT_GEN_A_CORE
Per primary team, patient had a bowel movement on Saturday.     GI Team will sign off. Please re-consult with any questions or concerns.     Tereza Mcdowell D.O.   Gastroenterology Fellow  Weekday 7am-5pm Pager: [phone]  Weeknights/Weekend/Holiday Coverage: Please call the  for contact info
Urology Chart Note    Patient prior evaluated for intractable abd pain. No acute urologic findings on workup in ED. Prior recommendations still appropriate, however appearing to empty bladder appropriately on US. Patient has broad urologic voiding complaints, would benefit from urodynamics consultation.     Rec patient f/up with Dr. Sandeep Goodson for routine initial consultation    Dr. Goodson Contact Information  [place], [address] [facility], [place] [address] [phone]

## 2024-10-21 NOTE — DIETITIAN INITIAL EVALUATION ADULT - PROBLEM SELECTOR PLAN 2
Pt s/p menopause at age 42, has urinary urgency for past 2 months. Potential urinary dysfunction 2/2 chronic interstitial cystitis. UA is negative, pt is s/p 3 rounds of abx (cefuroxime, Ciprofloxacin, and levofloxacin). Less likely GYN related given negative labs and imaging.    Plan:  - urology consulted, appreciate recs in regards to possible interstitial cystitis as cause of pain   - f/u urine cultures  - f/u STI panel  - bladder scans q6

## 2024-10-21 NOTE — PROGRESS NOTE ADULT - ATTENDING COMMENTS
Pt is 47 yo woman with slow transit-related constipation, Celiac dx, previously treated for UTIs, admitted with urinary frequency and large stool burden seen on imaging.   ----stool impaction not resolved with enema and PO regimen. Pt now with overflow diarrhea. Planned for additional enemas and manual disimpaction.  ----no evidence of UTI on this admission. Urinary frequency is likely due ot stool burden pressing on the bladder.
Pt is 47 yo woman with slow transit-related constipation, Celiac dx, previously treated for UTIs, admitted with urinary frequency and large stool burden seen on imaging.   ----stool impaction not resolved with enemas and Golytely. Asking GI team for sigmoidoscopy and possibly braking down fecalith if found.
48F hx of IBS-C, celiac, hypothyroidism, intra-abdominal adhesions, presenting with abdominal pain, constipation. Preivously treated for UTIs. Having multiple episodes of liquid stools for past 2 days on golytely and methylnatrexone, but abdominal pain continues to be severe, not improving, localized to the lower abdomen and associated with sensation of urinary urgency/frequency and dysuria. Post-menopausal, denies abnormal vaginal bleeding. Pt has been treated previously for UTIs and multiple UAs, including on this admission, negative for UTI recurrence. Ddx: bladder spasms, interstitial cystitis with contribution from opioid induced constipation, although this appears to be resolving   -Trial pyridium and oxybutynin for bladder muscle spasm  -Consider re-engaging urology tomorrow if sx not improving
49 yo woman admitted to the hospital for suprapubic pain , UA negative for sings of infection , TVUS, Pelvic US , Pelvic Exam negative for signs of GYN abnormalities , Initially treated for possible constipation - she is now having bowel movements but still has LUTS symptoms , no CVA tenderness ,  CT imaging negative for Pyelonephritis, Given Chronicity of symptoms and Recent Worsening with no other potential etiology - consider Treating with As Interstitial Cystitis with Amitriptyline
Pt is 47 yo woman with slow transit-related constipation, Celiac dx, previously treated for UTIs, admitted with urinary frequency and large stool burden seen on imaging.   ----for constipation pt is ordered for enemas and dulcolax PA and if unsuccessful should undergo manual disimpaction. Will need to be discharged on robust bowel regimen and GI follow up  ----no evidence of UTI on this admission. Urinary frequency is likely due ot stool burden pressing on the bladder.

## 2024-10-21 NOTE — DIETITIAN INITIAL EVALUATION ADULT - PERTINENT MEDS FT
MEDICATIONS  (STANDING):  bisacodyl 5 milliGRAM(s) Oral every 12 hours  enoxaparin Injectable 40 milliGRAM(s) SubCutaneous every 24 hours  estradiol 0.01% Vaginal Cream 0.5 Gram(s) Vaginal every 24 hours  influenza   Vaccine 0.5 milliLiter(s) IntraMuscular once  levothyroxine 50 MICROGram(s) Oral every 24 hours  lidocaine   4% Patch 1 Patch Transdermal every 24 hours  phenazopyridine 200 milliGRAM(s) Oral every 8 hours  polyethylene glycol 3350 17 Gram(s) Oral every 12 hours  senna 2 Tablet(s) Oral at bedtime    MEDICATIONS  (PRN):  acetaminophen     Tablet .. 650 milliGRAM(s) Oral every 6 hours PRN Temp greater or equal to 38C (100.4F), Mild Pain (1 - 3)  aluminum hydroxide/magnesium hydroxide/simethicone Suspension 30 milliLiter(s) Oral every 4 hours PRN Dyspepsia  lidocaine 2% Gel 1 Application(s) Topical five times a day PRN rectal pain  melatonin 3 milliGRAM(s) Oral at bedtime PRN Insomnia  ondansetron Injectable 4 milliGRAM(s) IV Push every 8 hours PRN Nausea and/or Vomiting  oxyCODONE    IR 5 milliGRAM(s) Oral every 8 hours PRN Severe Pain (7 - 10)  oxyCODONE    IR 2.5 milliGRAM(s) Oral every 12 hours PRN Moderate Pain (4 - 6)

## 2024-10-21 NOTE — DIETITIAN INITIAL EVALUATION ADULT - ADD RECOMMEND
1. Continue with regular diet - gluten free. Optimize pain regimen in order to promote PO intake.  2. Encourage pt to meet nutritional needs as able   3. Monitor labs: electrolytes, cmp  4. Monitor weights   5. Pain and bowel regimen per team   6. Will continue to assess/honor food preferences as able

## 2024-10-21 NOTE — DIETITIAN INITIAL EVALUATION ADULT - OTHER CALCULATIONS
Patient within % IBW (90%) thus actual body weight used for all calculations. Needs adjusted for malnutrition.

## 2024-10-21 NOTE — CONSULT NOTE ADULT - ASSESSMENT
The patient is 48 year old female consulted for vaginal pain and burning. Speculum exam and bimanual exam were both normal.   - Vaginitis swab from 10/15 was negative  - Patient is hemodynamically stable, afebrile, and has a normal WBC count  - Recommend consulting urology for urodynamic testing.  The patient is 48 year old female consulted for vaginal pain and burning.   At this time there is little evidence for GYN origin of her pain. Her TVUS was entirely normal suggesting no GYN structural abnormalities. Her pelvic exam was normal - no CMT or focal GYN tenderness or abnormalities. She already had a vaginitis swab on 10/15 which was negative.  My overall impression is that this pain could be secondary to dysfunctional voiding over many months/years which is suddently causing an acute worsening of her pain. Of note her UA from 10/15 was negative however her more recent UA from 10/20 is positive for nitrites suggesting possible UTI.  Her pain seems to be focused in bladder/L ureter/L kidney however no evidence  of pyelo given no CVAT, no fevers, no leukocytosis. Presentation inconsistent with nephrolithiasis given pain is constant, not cramping, and there is no blood on UA.  - Recommend treating possible UTI  - Recommend consulting urology to assess for OAB/dysfunctional voiding, or other urologic cause of her pain.  - No indication for repeat vaginitis testing given normal vaginitis last week  - Pt should have outpatient follow up with her gynecologist for routine GYN care (Dr. José Manuel Garcia)  - GYN signing off at this time, please reconsult if any additional questions    [facility] PGY3 discussed with Dr Bella   The patient is 48 year old female consulted for vaginal pain and burning.   At this time there is little evidence for GYN origin of her pain. Her TVUS was entirely normal suggesting no GYN structural abnormalities. Her pelvic exam was normal - no CMT or focal GYN tenderness or abnormalities. She already had a vaginitis swab on 10/15 which was negative.  My overall impression is that this pain could be secondary to dysfunctional voiding over many months/years which is suddently causing an acute worsening of her pain. Of note her UA from 10/15 was negative however her more recent UA from 10/20 is positive for nitrites suggesting possible UTI.  Her pain seems to be focused in bladder/L ureter/L kidney however no evidence  of pyelo given no CVAT, no fevers, no leukocytosis. Presentation inconsistent with nephrolithiasis given pain is constant, not cramping, and there is no blood on UA.  - Recommend treating possible UTI or collecting urine culture   - Recommend consulting urology to assess for OAB/dysfunctional voiding, or other urologic cause of her pain.  - No indication for repeat vaginitis testing given normal vaginitis last week  - Pt should have outpatient follow up with her gynecologist for routine GYN care (Dr. José Manuel Garcia)  - GYN signing off at this time, please reconsult if any additional questions    [facility] PGY3 discussed with Dr Bella

## 2024-10-21 NOTE — DIETITIAN INITIAL EVALUATION ADULT - OTHER INFO
48F w/ PMH of hypothyroid, celiac disease, appendectomy and chronic constipation, p/w 2mo severe lower abd pain & urinary urgency, w/ no evidence of obstruction on imaging. Likely 2/2 Opioid bowel syndrome iso chronic constipation.    Patient seen at bedside for nutrition assessment. Current diet order: regular, gluten free. Patient with celiac disease. Patient also reports allergy to dairy products causing tongue swelling and inflamed throat. Added to allergy list. No difficulty chewing/swallowing reported. Patient has not been able to tolerate PO during hospital stay due to severe lower abdominal pain. Patient seen by GI, GYN, and to be seen by urology to determine source of pain. Unable to provide appropriate pain control regimen at this time per patient. Reports initial imaging showed constipation so given bowel regimen which caused diarrhea but did not resolve pain. Reports chronic constipation and eats a generally high fiber diet. Reports she has not been able to tolerate much PO since around Labor Day over 1 month ago. Dosing weight: 130 pounds, BMI 19.2, reports weight fluctuates. No edema documented. Observed with moderate wasting to temples and clavicles. Based on ASPEN guidelines, patient meets criteria for severe malnutrition due to moderate muscle wasting and inadequate PO intake. Patient refused oral nutrition supplement at this time due to fluids causing increased abdominal pain. No pressure injuries documented. Last BM today. Labs: phosphorus 5.4. Meds: bowel regimen, levothyroxine, zofran. No cultural, ethnic, Jew food preferences reported. See nutrition recommendations below.

## 2024-10-21 NOTE — CONSULT NOTE ADULT - SUBJECTIVE AND OBJECTIVE BOX
The patient is 48 year old female consulted for vaginal pain and burning. The patient states that for the past 3 months she has been experiencing lower abdominal pain, vaginal pain, and vaginal burning. In the past week the pain has gotten unbearable. She describes it as a constant pressure and need to urinate that is pushing down on her abdomen. The pain is constant and does not come and go. The pain is worse when she eats or drinks anything. She says that the pain radiates from her lower abdomen to her left side and to her back on the left side. The pain in her back is relieved by pain medication, but the lower abdominal pain is remains the same. She states she has been having difficulty urinating stating that she feels the muscle to control her bladder is no longer working. She does not believe this is a bladder spasm. She states she was treated for a yeast infection a month ago due to white discharge. She denies any fever, chills, chest pain, shortness of breath, or vaginal bleeding. She endorses feeling nauseous and has had episodes of vomiting. She had a diagnostic laparoscopy done in  for endometriosis which came back negative. LMP was in 2017. Her most recent pap smear in 2024 was normal.     OB Hx-   - D+C 35 years ago   - SAB sometime in 20s  Gyn Hx  - the patient denies a history of uterine fibroids, positive STI tests or abnormal pap smears. She endorses having herpes in her 20s but has not had an outbreak since. She says she may have had a ruptured ovarian cyst 10 years ago.   Med Hx  -hypothyroidism treated with Synthroid 50mcg  Surg Hx  - diagnostic laparoscopy in  for endometriosis   - appendectomy 2017  Medications   - Synthroid 50 mcg   Allergies   Social Hx  - The patient lives between [facility] and [place] with her . She does not drink alcohol, smoke tobacco products, or use recreational drugs. She is a  and actor.   Family Hx  - Mother had ovarian and breast cancer, BRCA1 negative The patient is 48 year old  LMP 2017 consulted for vaginal pain and burning. The patient states that for the past 3 months she has been experiencing lower abdominal pain, vaginal pain, and vaginal burning. In the past week the pain has gotten unbearable. She describes it as a constant pressure and need to urinate that is pushing down on her abdomen. The pain is constant and does not come and go. The pain is worse when she eats or drinks anything. She says that the pain radiates from her lower abdomen to her left side and to her back on the left side. The pain in her back is relieved by pain medication, but the lower abdominal pain is remains the same. She states she has been having difficulty urinating stating that she feels the muscle to control her bladder is no longer working. She states she was treated for a yeast infection a month ago due to white discharge. She denies any fever, chills, chest pain, shortness of breath. Denies gross vaginal bleeding but is not sure if she sometimes has spots of blood in her urine. She endorses feeling nauseous and has had episodes of vomiting. She had a diagnostic laparoscopy done in 2017 for endometriosis which came back negative.     OB Hx-   - Her most recent pap smear in 2024 was normal  - TOP D+C 35 years ago   - SAB sometime in 20s  Gyn Hx  - the patient denies a history of uterine fibroids, positive STI tests or abnormal pap smears. She endorses having herpes in her 20s but has not had an outbreak since. She says she may have had a ruptured ovarian cyst 10 years ago.   Med Hx  -hypothyroidism treated with Synthroid 50mcg  Surg Hx  - diagnostic laparoscopy in 2017 for endometriosis   - appendectomy 2017  Medications   - Synthroid 50 mcg   Allergies   Social Hx  - The patient lives between [facility] and [place] with her . She does not drink alcohol, smoke tobacco products, or use recreational drugs. She is a  and actor.   - Last sexually active >3 months ago, has had severe dyspareunia associated with this pain   Family Hx  - Mother had ovarian and breast cancer, BRCA1 negative    Vital Signs Last 24 Hrs  T(C): 36.8 (21 Oct 2024 13:05), Max: 36.9 (20 Oct 2024 21:08)  T(F): 98.2 (21 Oct 2024 13:05), Max: 98.5 (20 Oct 2024 21:08)  HR: 97 (21 Oct 2024 13:05) (77 - 97)  BP: 128/81 (21 Oct 2024 13:05) (127/87 - 132/68)  BP(mean): 89 (21 Oct 2024 04:47) (89 - 89)  RR: 18 (21 Oct 2024 13:05) (17 - 18)  SpO2: 96% (21 Oct 2024 13:05) (96% - 98%)    Parameters below as of 21 Oct 2024 13:05  Patient On (Oxygen Delivery Method): room air    PE:  Gen: Anxious appearing, NAD  Abd: Diffuse guarding, no rebound, diffusely tender to palpation  ext: WNL  SSE: Normal appearing cervix. Atrophic vagina but WNL for post menopausal state. No VB or lesions noted. Normal external vagina.  Pelvic: No CMT. diffusely tender lower abdomen, no adnexal full ness or lesions appreciated. Diffiult to palpate uterus due to patient discomfort.    CTAP 10/15:    IMPRESSION:  No acute findings.  No evidence of acute pyelonephritis.    RPUS 10/15:  IMPRESSION:  Normal renal ultrasound.      Vaginitis 10/15: Negative    TVUS 10/15:   Fluid: None.    IMPRESSION:  Normal pelvic sonogram.   The patient is 48 year old  LMP 2017 consulted for vaginal pain and burning. The patient states that for the past 3 months she has been experiencing lower abdominal pain, vaginal pain, and vaginal burning. In the past week the pain has gotten unbearable. She describes it as a constant pressure and need to urinate that is pushing down on her abdomen. The pain is constant and does not come and go. The pain is worse when she eats or drinks anything. She says that the pain radiates from her lower abdomen to her left side and to her back on the left side. The pain in her back is relieved by pain medication, but the lower abdominal pain is remains the same. She states she has been having difficulty urinating stating that she feels the muscle to control her bladder is no longer working. She states she was treated for a yeast infection a month ago due to white discharge. She denies any fever, chills, chest pain, shortness of breath. Denies gross vaginal bleeding but is not sure if she sometimes has spots of blood in her urine. She endorses feeling nauseous and has had episodes of vomiting. She had a diagnostic laparoscopy done in 2017 for endometriosis which came back negative. WHen she points to where the pain is worst she points to the suprapubic region and tracks to her L flank. She reports that only IV morphine has improved the pain, that dilaudid does not help.  She reports bowel movements QD with baseline constipation but that her regimen of probiotics allows her to have one loose bowel movement per day at least.    OB Hx-   - Her most recent pap smear in 2024 was normal  - TOP D+C 35 years ago   - SAB sometime in 20s  - She states she was tested for BRCA due to family history of mother with ovarian and breast cancer but both she and her mother were negative for BRCA  Gyn Hx  - the patient denies a history of uterine fibroids, positive STI tests or abnormal pap smears. She endorses having herpes in her 20s but has not had an outbreak since. She says she may have had a ruptured ovarian cyst 10 years ago.   Med Hx  -hypothyroidism treated with Synthroid 50mcg  Surg Hx  - diagnostic laparoscopy in 2017 for endometriosis   - appendectomy 2017  Medications   - Synthroid 50 mcg   Allergies   Social Hx  - The patient lives between [facility] and [place] with her . She does not drink alcohol, smoke tobacco products, or use recreational drugs. She is a  and actor.   - Last sexually active >3 months ago, has had severe dyspareunia associated with this pain   Family Hx  - Mother had ovarian and breast cancer    Vital Signs Last 24 Hrs  T(C): 36.8 (21 Oct 2024 13:05), Max: 36.9 (20 Oct 2024 21:08)  T(F): 98.2 (21 Oct 2024 13:05), Max: 98.5 (20 Oct 2024 21:08)  HR: 97 (21 Oct 2024 13:05) (77 - 97)  BP: 128/81 (21 Oct 2024 13:05) (127/87 - 132/68)  BP(mean): 89 (21 Oct 2024 04:47) (89 - 89)  RR: 18 (21 Oct 2024 13:05) (17 - 18)  SpO2: 96% (21 Oct 2024 13:05) (96% - 98%)    Parameters below as of 21 Oct 2024 13:05  Patient On (Oxygen Delivery Method): room air    PE:  Gen: Anxious appearing, NAD  Abd: Diffuse guarding, no rebound, diffusely tender to palpation  ext: WNL  SSE: Normal appearing cervix. Atrophic vagina but WNL for post menopausal state. No VB or lesions noted. Normal external vagina. Exquisite anterior tenderness when placing speculum.  Pelvic: No CMT. diffusely tender lower abdomen, no adnexal full ness or lesions appreciated. Diffiult to palpate uterus due to patient discomfort.    CTAP 10/15:    IMPRESSION:  No acute findings.  No evidence of acute pyelonephritis.    RPUS 10/15:  IMPRESSION:  Normal renal ultrasound.      Vaginitis 10/15: Negative    TVUS 10/15:   Fluid: None.    IMPRESSION:  Normal pelvic sonogram.   The patient is 48 year old  LMP 2017 consulted for vaginal pain and burning. The patient states that for the past 3 months she has been experiencing lower abdominal pain, vaginal pain, and vaginal burning. In the past week the pain has gotten unbearable. She describes it as a constant pressure and need to urinate that is pushing down on her abdomen. The pain is constant and does not come and go. The pain is worse when she eats or drinks anything. She says that the pain radiates from her lower abdomen to her left side and to her back on the left side. The pain in her back is relieved by pain medication, but the lower abdominal pain is remains the same. She states she has been having difficulty urinating stating that she feels the muscle to control her bladder is no longer working. She states she was treated for a yeast infection a month ago due to white discharge. She denies any fever, chills, chest pain, shortness of breath. Denies gross vaginal bleeding but is not sure if she sometimes has spots of blood in her urine. She endorses feeling nauseous and has had episodes of vomiting. She had a diagnostic laparoscopy done in 2017 for endometriosis which came back negative. WHen she points to where the pain is worst she points to the suprapubic region and tracks to her L flank. She reports that only IV morphine has improved the pain, that dilaudid does not help.  She reports bowel movements QD with baseline constipation but that her regimen of probiotics allows her to have one loose bowel movement per day at least.    OB Hx-   - Her most recent pap smear in 2024 was normal  - TOP D+C 35 years ago   - SAB sometime in 20s  - She states she was tested for BRCA due to family history of mother with ovarian and breast cancer but both she and her mother were negative for BRCA  Gyn Hx  - the patient denies a history of uterine fibroids, positive STI tests or abnormal pap smears. She endorses having herpes in her 20s but has not had an outbreak since. She says she may have had a ruptured ovarian cyst 10 years ago.   Med Hx  -hypothyroidism treated with Synthroid 50mcg  Surg Hx  - diagnostic laparoscopy in 2017 for endometriosis (no lesions noted per patient)   - appendectomy 2017  Medications   - Synthroid 50 mcg   Allergies   Social Hx  - The patient lives between [facility] and [place] with her . She does not drink alcohol, smoke tobacco products, or use recreational drugs. She is a  and actor.   - Last sexually active >3 months ago, has had severe dyspareunia associated with this pain   Family Hx  - Mother had ovarian and breast cancer    Vital Signs Last 24 Hrs  T(C): 36.8 (21 Oct 2024 13:05), Max: 36.9 (20 Oct 2024 21:08)  T(F): 98.2 (21 Oct 2024 13:05), Max: 98.5 (20 Oct 2024 21:08)  HR: 97 (21 Oct 2024 13:05) (77 - 97)  BP: 128/81 (21 Oct 2024 13:05) (127/87 - 132/68)  BP(mean): 89 (21 Oct 2024 04:47) (89 - 89)  RR: 18 (21 Oct 2024 13:05) (17 - 18)  SpO2: 96% (21 Oct 2024 13:05) (96% - 98%)    Parameters below as of 21 Oct 2024 13:05  Patient On (Oxygen Delivery Method): room air    PE:  Gen: Anxious appearing, NAD  Abd: Diffuse guarding, no rebound, diffusely tender to palpation  ext: WNL  SSE: Normal appearing cervix. Atrophic vagina but WNL for post menopausal state. No VB or lesions noted. Normal external vagina. Exquisite anterior tenderness when placing speculum.  Pelvic: No CMT. diffusely tender lower abdomen, no adnexal full ness or lesions appreciated. Diffiult to palpate uterus due to patient discomfort.    CTAP 10/15:    IMPRESSION:  No acute findings.  No evidence of acute pyelonephritis.    RPUS 10/15:  IMPRESSION:  Normal renal ultrasound.      Vaginitis 10/15: Negative    TVUS 10/15:   Fluid: None.    IMPRESSION:  Normal pelvic sonogram.

## 2024-10-21 NOTE — PROGRESS NOTE ADULT - SUBJECTIVE AND OBJECTIVE BOX
OVERNIGHT EVENTS:    SUBJECTIVE / INTERVAL HPI: Patient seen and examined at bedside.       PHYSICAL EXAM:    General: NAD  HEENT: NC/AT; PERRL, anicteric sclera; MMM  Neck: supple  Cardiovascular: +S1/S2, RRR  Respiratory: CTA B/L; no W/R/R  Gastrointestinal: soft, NT/ND; +BSx4  Extremities: WWP; no edema, clubbing or cyanosis  Vascular: 2+ radial, DP/PT pulses B/L  Neurological: AAOx3; no focal deficits  Psychiatric: pleasant mood and affect  Dermatologic: no appreciable wounds or damage to the skin    VITAL SIGNS:  Vital Signs Last 24 Hrs  T(C): 36.3 (21 Oct 2024 04:47), Max: 37.3 (20 Oct 2024 12:52)  T(F): 97.4 (21 Oct 2024 04:47), Max: 99.2 (20 Oct 2024 12:52)  HR: 80 (21 Oct 2024 04:47) (65 - 80)  BP: 132/68 (21 Oct 2024 04:47) (115/78 - 132/68)  BP(mean): 89 (21 Oct 2024 04:47) (89 - 89)  RR: 18 (21 Oct 2024 04:47) (17 - 18)  SpO2: 98% (21 Oct 2024 04:47) (96% - 99%)    Parameters below as of 21 Oct 2024 04:47  Patient On (Oxygen Delivery Method): room air          MEDICATIONS:  MEDICATIONS  (STANDING):  bisacodyl 5 milliGRAM(s) Oral every 12 hours  enoxaparin Injectable 40 milliGRAM(s) SubCutaneous every 24 hours  influenza   Vaccine 0.5 milliLiter(s) IntraMuscular once  levothyroxine 50 MICROGram(s) Oral every 24 hours  lidocaine   4% Patch 1 Patch Transdermal every 24 hours  oxybutynin 5 milliGRAM(s) Oral every 12 hours  phenazopyridine 200 milliGRAM(s) Oral every 8 hours  polyethylene glycol 3350 17 Gram(s) Oral every 12 hours  senna 2 Tablet(s) Oral at bedtime    MEDICATIONS  (PRN):  acetaminophen     Tablet .. 650 milliGRAM(s) Oral every 6 hours PRN Temp greater or equal to 38C (100.4F), Mild Pain (1 - 3)  aluminum hydroxide/magnesium hydroxide/simethicone Suspension 30 milliLiter(s) Oral every 4 hours PRN Dyspepsia  HYDROmorphone  Injectable 0.5 milliGRAM(s) IV Push every 6 hours PRN breakthrough pain  lidocaine 2% Gel 1 Application(s) Topical five times a day PRN rectal pain  melatonin 3 milliGRAM(s) Oral at bedtime PRN Insomnia  ondansetron Injectable 4 milliGRAM(s) IV Push every 8 hours PRN Nausea and/or Vomiting  oxyCODONE    IR 5 milliGRAM(s) Oral every 8 hours PRN Severe Pain (7 - 10)  oxyCODONE    IR 2.5 milliGRAM(s) Oral every 12 hours PRN Moderate Pain (4 - 6)      ALLERGIES:  Allergies    sulfa drugs (Hives; Rash)  Augmentin (Hives; Rash)  Gluten (Diarrhea)    Intolerances        LABS:                        12.6   5.03  )-----------( 218      ( 20 Oct 2024 12:00 )             37.1             Urinalysis Basic - ( 20 Oct 2024 09:08 )    Color: Dark Yellow / Appearance: Clear / S.007 / pH: x  Gluc: x / Ketone: Negative mg/dL  / Bili: Small / Urobili: 1.0 mg/dL   Blood: x / Protein: Negative mg/dL / Nitrite: Positive   Leuk Esterase: Trace / RBC: 1 /HPF / WBC 0 /HPF   Sq Epi: x / Non Sq Epi: 0 /HPF / Bacteria: Negative /HPF      CAPILLARY BLOOD GLUCOSE          RADIOLOGY & ADDITIONAL TESTS: Reviewed. OVERNIGHT EVENTS: HARPAL.    SUBJECTIVE / INTERVAL HPI: Patient seen and examined at bedside. Pt resting comfortably. States she does not want IV dilaudid, as it does not help, only wants IV morphine. States the oxycodone is okay in terms of relieving her pain. Pt states she still has abdominal pain despite adequate BMs. Pain is suprapubic and radiates up to her L flank, also has urinary hesitancy and fullness. Pt also does not want oxybutinin. Denies fevers, chills, HA, chest pain, SOB, n/v, swelling in extremities. ROS otherwise negative.      PHYSICAL EXAM:    General: NAD  HEENT: NC/AT; PERRL, anicteric sclera; MMM  Neck: supple  Cardiovascular: +S1/S2, RRR  Respiratory: CTA B/L; no W/R/R  Gastrointestinal:soft, improved distension, lower abdomen diffusely tender to palpation; +BSx4  Extremities: WWP; no edema, clubbing or cyanosis  Vascular: 2+ radial, DP/PT pulses B/L  Neurological: AAOx3; no focal deficits  Psychiatric: pleasant mood and affect  Dermatologic: no appreciable wounds or damage to the skin    VITAL SIGNS:  Vital Signs Last 24 Hrs  T(C): 36.3 (21 Oct 2024 04:47), Max: 37.3 (20 Oct 2024 12:52)  T(F): 97.4 (21 Oct 2024 04:47), Max: 99.2 (20 Oct 2024 12:52)  HR: 80 (21 Oct 2024 04:47) (65 - 80)  BP: 132/68 (21 Oct 2024 04:47) (115/78 - 132/68)  BP(mean): 89 (21 Oct 2024 04:47) (89 - 89)  RR: 18 (21 Oct 2024 04:47) (17 - 18)  SpO2: 98% (21 Oct 2024 04:47) (96% - 99%)    Parameters below as of 21 Oct 2024 04:47  Patient On (Oxygen Delivery Method): room air          MEDICATIONS:  MEDICATIONS  (STANDING):  bisacodyl 5 milliGRAM(s) Oral every 12 hours  enoxaparin Injectable 40 milliGRAM(s) SubCutaneous every 24 hours  influenza   Vaccine 0.5 milliLiter(s) IntraMuscular once  levothyroxine 50 MICROGram(s) Oral every 24 hours  lidocaine   4% Patch 1 Patch Transdermal every 24 hours  oxybutynin 5 milliGRAM(s) Oral every 12 hours  phenazopyridine 200 milliGRAM(s) Oral every 8 hours  polyethylene glycol 3350 17 Gram(s) Oral every 12 hours  senna 2 Tablet(s) Oral at bedtime    MEDICATIONS  (PRN):  acetaminophen     Tablet .. 650 milliGRAM(s) Oral every 6 hours PRN Temp greater or equal to 38C (100.4F), Mild Pain (1 - 3)  aluminum hydroxide/magnesium hydroxide/simethicone Suspension 30 milliLiter(s) Oral every 4 hours PRN Dyspepsia  HYDROmorphone  Injectable 0.5 milliGRAM(s) IV Push every 6 hours PRN breakthrough pain  lidocaine 2% Gel 1 Application(s) Topical five times a day PRN rectal pain  melatonin 3 milliGRAM(s) Oral at bedtime PRN Insomnia  ondansetron Injectable 4 milliGRAM(s) IV Push every 8 hours PRN Nausea and/or Vomiting  oxyCODONE    IR 5 milliGRAM(s) Oral every 8 hours PRN Severe Pain (7 - 10)  oxyCODONE    IR 2.5 milliGRAM(s) Oral every 12 hours PRN Moderate Pain (4 - 6)      ALLERGIES:  Allergies    sulfa drugs (Hives; Rash)  Augmentin (Hives; Rash)  Gluten (Diarrhea)    Intolerances        LABS:                        12.6   5.03  )-----------( 218      ( 20 Oct 2024 12:00 )             37.1             Urinalysis Basic - ( 20 Oct 2024 09:08 )    Color: Dark Yellow / Appearance: Clear / S.007 / pH: x  Gluc: x / Ketone: Negative mg/dL  / Bili: Small / Urobili: 1.0 mg/dL   Blood: x / Protein: Negative mg/dL / Nitrite: Positive   Leuk Esterase: Trace / RBC: 1 /HPF / WBC 0 /HPF   Sq Epi: x / Non Sq Epi: 0 /HPF / Bacteria: Negative /HPF      CAPILLARY BLOOD GLUCOSE          RADIOLOGY & ADDITIONAL TESTS: Reviewed.

## 2024-10-21 NOTE — PROGRESS NOTE ADULT - PROBLEM SELECTOR PLAN 2
Pt s/p menopause at age 42, has urinary urgency for past 2 months. Potential urinary dysfunction 2/2 chronic interstitial cystitis. UA is negative, pt is s/p 3 rounds of abx (cefuroxime, Ciprofloxacin, and levofloxacin). Less likely GYN related given negative labs and imaging.  GC neg, chlamydia neg.    Plan:  - will trial pyridium and oxybutynin   - consider reconsulting urology for further recommendations  - would benefit from urodynamics consultation --> should f/u with Dr. Goodson  - bladder scans q6 Pt s/p menopause at age 42, has urinary urgency for past 2 months. Potential urinary dysfunction 2/2 chronic interstitial cystitis. UA is negative, pt is s/p 3 rounds of abx (cefuroxime, Ciprofloxacin, and levofloxacin). Less likely GYN related given negative labs and imaging.  GC neg, chlamydia neg.    Plan:  - C/w pyridium, d/c oxybutynin as pt does not want it  - Urology reconsulted, f/u further recommendations  - GYN consulted, no acute interventions on their end, pain could be due to dysfunctional voiding  - would benefit from urodynamics consultation --> should f/u with Dr. Goodson  - bladder scans q6

## 2024-10-22 ENCOUNTER — TRANSCRIPTION ENCOUNTER (OUTPATIENT)
Age: 49
End: 2024-10-22

## 2024-10-22 VITALS
SYSTOLIC BLOOD PRESSURE: 116 MMHG | RESPIRATION RATE: 18 BRPM | DIASTOLIC BLOOD PRESSURE: 83 MMHG | TEMPERATURE: 98 F | OXYGEN SATURATION: 97 % | HEART RATE: 90 BPM

## 2024-10-22 PROBLEM — E07.9 DISORDER OF THYROID, UNSPECIFIED: Chronic | Status: ACTIVE | Noted: 2024-10-15

## 2024-10-22 LAB
ANION GAP SERPL CALC-SCNC: 10 MMOL/L — SIGNIFICANT CHANGE UP (ref 5–17)
BUN SERPL-MCNC: 9 MG/DL — SIGNIFICANT CHANGE UP (ref 7–23)
CALCIUM SERPL-MCNC: 9.8 MG/DL — SIGNIFICANT CHANGE UP (ref 8.4–10.5)
CHLORIDE SERPL-SCNC: 107 MMOL/L — SIGNIFICANT CHANGE UP (ref 96–108)
CO2 SERPL-SCNC: 23 MMOL/L — SIGNIFICANT CHANGE UP (ref 22–31)
CREAT SERPL-MCNC: 0.87 MG/DL — SIGNIFICANT CHANGE UP (ref 0.5–1.3)
EGFR: 82 ML/MIN/1.73M2 — SIGNIFICANT CHANGE UP
GLUCOSE SERPL-MCNC: 93 MG/DL — SIGNIFICANT CHANGE UP (ref 70–99)
HCT VFR BLD CALC: 37.7 % — SIGNIFICANT CHANGE UP (ref 34.5–45)
HGB BLD-MCNC: 13 G/DL — SIGNIFICANT CHANGE UP (ref 11.5–15.5)
MAGNESIUM SERPL-MCNC: 2.4 MG/DL — SIGNIFICANT CHANGE UP (ref 1.6–2.6)
MCHC RBC-ENTMCNC: 33.1 PG — SIGNIFICANT CHANGE UP (ref 27–34)
MCHC RBC-ENTMCNC: 34.5 GM/DL — SIGNIFICANT CHANGE UP (ref 32–36)
MCV RBC AUTO: 95.9 FL — SIGNIFICANT CHANGE UP (ref 80–100)
NRBC # BLD: 0 /100 WBCS — SIGNIFICANT CHANGE UP (ref 0–0)
PHOSPHATE SERPL-MCNC: 4.7 MG/DL — HIGH (ref 2.5–4.5)
PLATELET # BLD AUTO: 199 K/UL — SIGNIFICANT CHANGE UP (ref 150–400)
POTASSIUM SERPL-MCNC: 4.8 MMOL/L — SIGNIFICANT CHANGE UP (ref 3.5–5.3)
POTASSIUM SERPL-SCNC: 4.8 MMOL/L — SIGNIFICANT CHANGE UP (ref 3.5–5.3)
RBC # BLD: 3.93 M/UL — SIGNIFICANT CHANGE UP (ref 3.8–5.2)
RBC # FLD: 12.3 % — SIGNIFICANT CHANGE UP (ref 10.3–14.5)
SODIUM SERPL-SCNC: 140 MMOL/L — SIGNIFICANT CHANGE UP (ref 135–145)
WBC # BLD: 4.64 K/UL — SIGNIFICANT CHANGE UP (ref 3.8–10.5)
WBC # FLD AUTO: 4.64 K/UL — SIGNIFICANT CHANGE UP (ref 3.8–10.5)

## 2024-10-22 PROCEDURE — 74019 RADEX ABDOMEN 2 VIEWS: CPT

## 2024-10-22 PROCEDURE — 81001 URINALYSIS AUTO W/SCOPE: CPT

## 2024-10-22 PROCEDURE — 87801 DETECT AGNT MULT DNA AMPLI: CPT

## 2024-10-22 PROCEDURE — 99239 HOSP IP/OBS DSCHRG MGMT >30: CPT | Mod: GC

## 2024-10-22 PROCEDURE — 80048 BASIC METABOLIC PNL TOTAL CA: CPT

## 2024-10-22 PROCEDURE — 85027 COMPLETE CBC AUTOMATED: CPT

## 2024-10-22 PROCEDURE — 96375 TX/PRO/DX INJ NEW DRUG ADDON: CPT

## 2024-10-22 PROCEDURE — 87086 URINE CULTURE/COLONY COUNT: CPT

## 2024-10-22 PROCEDURE — 87798 DETECT AGENT NOS DNA AMP: CPT

## 2024-10-22 PROCEDURE — 36415 COLL VENOUS BLD VENIPUNCTURE: CPT

## 2024-10-22 PROCEDURE — 76830 TRANSVAGINAL US NON-OB: CPT

## 2024-10-22 PROCEDURE — 83690 ASSAY OF LIPASE: CPT

## 2024-10-22 PROCEDURE — 87563 M. GENITALIUM AMP PROBE: CPT

## 2024-10-22 PROCEDURE — 85025 COMPLETE CBC W/AUTO DIFF WBC: CPT

## 2024-10-22 PROCEDURE — 74177 CT ABD & PELVIS W/CONTRAST: CPT | Mod: MC

## 2024-10-22 PROCEDURE — 87491 CHLMYD TRACH DNA AMP PROBE: CPT

## 2024-10-22 PROCEDURE — 76770 US EXAM ABDO BACK WALL COMP: CPT

## 2024-10-22 PROCEDURE — 81003 URINALYSIS AUTO W/O SCOPE: CPT

## 2024-10-22 PROCEDURE — 83735 ASSAY OF MAGNESIUM: CPT

## 2024-10-22 PROCEDURE — 84702 CHORIONIC GONADOTROPIN TEST: CPT

## 2024-10-22 PROCEDURE — 87591 N.GONORRHOEAE DNA AMP PROB: CPT

## 2024-10-22 PROCEDURE — 87661 TRICHOMONAS VAGINALIS AMPLIF: CPT

## 2024-10-22 PROCEDURE — 96374 THER/PROPH/DIAG INJ IV PUSH: CPT

## 2024-10-22 PROCEDURE — 99285 EMERGENCY DEPT VISIT HI MDM: CPT

## 2024-10-22 PROCEDURE — 80053 COMPREHEN METABOLIC PANEL: CPT

## 2024-10-22 PROCEDURE — 76856 US EXAM PELVIC COMPLETE: CPT

## 2024-10-22 PROCEDURE — 84100 ASSAY OF PHOSPHORUS: CPT

## 2024-10-22 RX ORDER — PHENAZOPYRIDINE HCL 95 MG
200 TABLET ORAL EVERY 8 HOURS
Refills: 0 | Status: DISCONTINUED | OUTPATIENT
Start: 2024-10-22 | End: 2024-10-22

## 2024-10-22 RX ORDER — NITROFURANTOIN 25 MG/5ML
1 SUSPENSION ORAL
Qty: 10 | Refills: 0
Start: 2024-10-22 | End: 2024-10-26

## 2024-10-22 RX ORDER — OXYCODONE HYDROCHLORIDE 30 MG/1
1 TABLET ORAL
Qty: 40 | Refills: 0
Start: 2024-10-22 | End: 2024-10-31

## 2024-10-22 RX ORDER — SENNA 187 MG
2 TABLET ORAL
Qty: 0 | Refills: 0 | DISCHARGE
Start: 2024-10-22

## 2024-10-22 RX ORDER — ESTRADIOL 10 UG/1
2 TABLET VAGINAL EVERY 24 HOURS
Refills: 0 | Status: DISCONTINUED | OUTPATIENT
Start: 2024-10-22 | End: 2024-10-22

## 2024-10-22 RX ORDER — OXYCODONE AND ACETAMINOPHEN 7.5; 325 MG/1; MG/1
1 TABLET ORAL
Qty: 40 | Refills: 0
Start: 2024-10-22 | End: 2024-10-31

## 2024-10-22 RX ORDER — OXYCODONE HYDROCHLORIDE 30 MG/1
1 TABLET ORAL
Qty: 28 | Refills: 0
Start: 2024-10-22 | End: 2024-10-28

## 2024-10-22 RX ORDER — OXYCODONE HYDROCHLORIDE 30 MG/1
5 TABLET ORAL EVERY 6 HOURS
Refills: 0 | Status: DISCONTINUED | OUTPATIENT
Start: 2024-10-22 | End: 2024-10-22

## 2024-10-22 RX ORDER — OXYCODONE HYDROCHLORIDE 30 MG/1
5 TABLET ORAL
Qty: 0 | Refills: 0 | DISCHARGE
Start: 2024-10-22

## 2024-10-22 RX ADMIN — LIDOCAINE HYDROCHLORIDE 1 PATCH: 40 SOLUTION TOPICAL at 12:52

## 2024-10-22 RX ADMIN — OXYCODONE HYDROCHLORIDE 2.5 MILLIGRAM(S): 30 TABLET ORAL at 12:48

## 2024-10-22 RX ADMIN — ONDANSETRON HYDROCHLORIDE 4 MILLIGRAM(S): 2 INJECTION, SOLUTION INTRAMUSCULAR; INTRAVENOUS at 07:28

## 2024-10-22 RX ADMIN — ESTRADIOL 2 GRAM(S): 10 TABLET VAGINAL at 14:18

## 2024-10-22 RX ADMIN — OXYCODONE HYDROCHLORIDE 5 MILLIGRAM(S): 30 TABLET ORAL at 07:09

## 2024-10-22 RX ADMIN — OXYCODONE HYDROCHLORIDE 5 MILLIGRAM(S): 30 TABLET ORAL at 15:18

## 2024-10-22 RX ADMIN — Medication 200 MILLIGRAM(S): at 05:51

## 2024-10-22 RX ADMIN — OXYCODONE HYDROCHLORIDE 2.5 MILLIGRAM(S): 30 TABLET ORAL at 13:48

## 2024-10-22 RX ADMIN — OXYCODONE HYDROCHLORIDE 5 MILLIGRAM(S): 30 TABLET ORAL at 14:18

## 2024-10-22 RX ADMIN — Medication 50 MICROGRAM(S): at 05:51

## 2024-10-22 NOTE — DISCHARGE NOTE NURSING/CASE MANAGEMENT/SOCIAL WORK - NSDCPEFALRISK_GEN_ALL_CORE
For information on Fall & Injury Prevention, visit: [email or web address] OR  [email or web address] OR  https://www.cdc.gov/steadi/patient.html

## 2024-10-22 NOTE — PROGRESS NOTE ADULT - PROBLEM SELECTOR PLAN 2
Pt s/p menopause at age 42, has urinary urgency for past 2 months. Potential urinary dysfunction 2/2 chronic interstitial cystitis. UA is negative, pt is s/p 3 rounds of abx (cefuroxime, Ciprofloxacin, and levofloxacin). Less likely GYN related given negative labs and imaging.  GC neg, chlamydia neg.    Plan:  - C/w pyridium, d/c oxybutynin as pt does not want it  - Urology reconsulted, f/u further recommendations  - GYN consulted, no acute interventions on their end, pain could be due to dysfunctional voiding  - would benefit from urodynamics consultation --> should f/u with Dr. Goodson  - bladder scans q6 Pt s/p menopause at age 42, has urinary urgency for past 2 months. Potential urinary dysfunction 2/2 chronic interstitial cystitis. UA is negative, pt is s/p 3 rounds of abx (cefuroxime, Ciprofloxacin, and levofloxacin). Less likely GYN related given negative labs and imaging.  GC neg, chlamydia neg.  S/p pyridium x2d, pt refused oxybutinin    Plan:  - Urology reconsulted, f/u further recommendations  - GYN consulted, no acute interventions on their end, pain could be due to dysfunctional voiding  - would benefit from urodynamics consultation --> should f/u with Dr. Goodson  - bladder scans q6  - gyn consulted, no acute interventions on their end, pain could be due to dysfunctional voiding  - start estradiol cream daily for vaginal atrophy

## 2024-10-22 NOTE — PROGRESS NOTE ADULT - PROBLEM SELECTOR PLAN 4
Pt on home synthroid 50mcg    Plan:  - continue synthroid 50mcg

## 2024-10-22 NOTE — DISCHARGE NOTE NURSING/CASE MANAGEMENT/SOCIAL WORK - PATIENT PORTAL LINK FT
You can access the FollowMyHealth Patient Portal offered by [facility] by registering at the following website: [email or web address]. By joining [employer]’s FollowMyHealth portal, you will also be able to view your health information using other applications (apps) compatible with our system.

## 2024-10-22 NOTE — PROGRESS NOTE ADULT - SUBJECTIVE AND OBJECTIVE BOX
OVERNIGHT EVENTS:    SUBJECTIVE / INTERVAL HPI: Patient seen and examined at bedside.       PHYSICAL EXAM:    General: NAD  HEENT: NC/AT; PERRL, anicteric sclera; MMM  Neck: supple  Cardiovascular: +S1/S2, RRR  Respiratory: CTA B/L; no W/R/R  Gastrointestinal: soft, NT/ND; +BSx4  Extremities: WWP; no edema, clubbing or cyanosis  Vascular: 2+ radial, DP/PT pulses B/L  Neurological: AAOx3; no focal deficits  Psychiatric: pleasant mood and affect  Dermatologic: no appreciable wounds or damage to the skin    VITAL SIGNS:  Vital Signs Last 24 Hrs  T(C): 36.5 (22 Oct 2024 05:43), Max: 36.8 (21 Oct 2024 13:05)  T(F): 97.7 (22 Oct 2024 05:43), Max: 98.2 (21 Oct 2024 13:05)  HR: 99 (22 Oct 2024 05:43) (84 - 99)  BP: 95/64 (22 Oct 2024 05:43) (95/64 - 135/83)  BP(mean): --  RR: 18 (22 Oct 2024 05:43) (18 - 18)  SpO2: 95% (22 Oct 2024 05:43) (95% - 99%)    Parameters below as of 22 Oct 2024 05:43  Patient On (Oxygen Delivery Method): room air          MEDICATIONS:  MEDICATIONS  (STANDING):  bisacodyl 5 milliGRAM(s) Oral every 12 hours  enoxaparin Injectable 40 milliGRAM(s) SubCutaneous every 24 hours  influenza   Vaccine 0.5 milliLiter(s) IntraMuscular once  levothyroxine 50 MICROGram(s) Oral every 24 hours  lidocaine   4% Patch 1 Patch Transdermal every 24 hours  phenazopyridine 200 milliGRAM(s) Oral every 8 hours  polyethylene glycol 3350 17 Gram(s) Oral every 12 hours  senna 2 Tablet(s) Oral at bedtime    MEDICATIONS  (PRN):  acetaminophen     Tablet .. 650 milliGRAM(s) Oral every 6 hours PRN Temp greater or equal to 38C (100.4F), Mild Pain (1 - 3)  aluminum hydroxide/magnesium hydroxide/simethicone Suspension 30 milliLiter(s) Oral every 4 hours PRN Dyspepsia  lidocaine 2% Gel 1 Application(s) Topical five times a day PRN rectal pain  melatonin 3 milliGRAM(s) Oral at bedtime PRN Insomnia  ondansetron Injectable 4 milliGRAM(s) IV Push every 8 hours PRN Nausea and/or Vomiting  oxyCODONE    IR 5 milliGRAM(s) Oral every 8 hours PRN Severe Pain (7 - 10)  oxyCODONE    IR 2.5 milliGRAM(s) Oral every 12 hours PRN Moderate Pain (4 - 6)      ALLERGIES:  Allergies    sulfa drugs (Hives; Rash)  Augmentin (Hives; Rash)  Gluten (Diarrhea)  dairy products (Unknown)    Intolerances        LABS:                        12.6   5.03  )-----------( 218      ( 20 Oct 2024 12:00 )             37.1             Urinalysis Basic - ( 20 Oct 2024 09:08 )    Color: Dark Yellow / Appearance: Clear / S.007 / pH: x  Gluc: x / Ketone: Negative mg/dL  / Bili: Small / Urobili: 1.0 mg/dL   Blood: x / Protein: Negative mg/dL / Nitrite: Positive   Leuk Esterase: Trace / RBC: 1 /HPF / WBC 0 /HPF   Sq Epi: x / Non Sq Epi: 0 /HPF / Bacteria: Negative /HPF      CAPILLARY BLOOD GLUCOSE          RADIOLOGY & ADDITIONAL TESTS: Reviewed. OVERNIGHT EVENTS: HARPAL.    SUBJECTIVE / INTERVAL HPI: Patient seen and examined at bedside. Pt resting comfortably with  at the bedside. States her pain is well controlled with oxycodone 5mg every 6 hours, though she still has episodes of worsening abdominal pain with diaphoresis and shaking, which resolve. Pt reports having BMs. Denies fevers, chills, HA, chest pain, SOB, n/v/d, swelling in extremities. ROS otherwise negative.      PHYSICAL EXAM:    General: NAD  HEENT: NC/AT; PERRL, anicteric sclera; MMM  Neck: supple  Cardiovascular: +S1/S2, RRR  Respiratory: CTA B/L; no W/R/R  Gastrointestinal:soft, improved distension, lower abdomen tender to palpation; +BSx4  Extremities: WWP; no edema, clubbing or cyanosis  Vascular: 2+ radial, DP/PT pulses B/L  Neurological: AAOx3; no focal deficits  Psychiatric: pleasant mood and affect  Dermatologic: no appreciable wounds or damage to the skin    VITAL SIGNS:  Vital Signs Last 24 Hrs  T(C): 36.5 (22 Oct 2024 05:43), Max: 36.8 (21 Oct 2024 13:05)  T(F): 97.7 (22 Oct 2024 05:43), Max: 98.2 (21 Oct 2024 13:05)  HR: 99 (22 Oct 2024 05:43) (84 - 99)  BP: 95/64 (22 Oct 2024 05:43) (95/64 - 135/83)  BP(mean): --  RR: 18 (22 Oct 2024 05:43) (18 - 18)  SpO2: 95% (22 Oct 2024 05:43) (95% - 99%)    Parameters below as of 22 Oct 2024 05:43  Patient On (Oxygen Delivery Method): room air          MEDICATIONS:  MEDICATIONS  (STANDING):  bisacodyl 5 milliGRAM(s) Oral every 12 hours  enoxaparin Injectable 40 milliGRAM(s) SubCutaneous every 24 hours  influenza   Vaccine 0.5 milliLiter(s) IntraMuscular once  levothyroxine 50 MICROGram(s) Oral every 24 hours  lidocaine   4% Patch 1 Patch Transdermal every 24 hours  phenazopyridine 200 milliGRAM(s) Oral every 8 hours  polyethylene glycol 3350 17 Gram(s) Oral every 12 hours  senna 2 Tablet(s) Oral at bedtime    MEDICATIONS  (PRN):  acetaminophen     Tablet .. 650 milliGRAM(s) Oral every 6 hours PRN Temp greater or equal to 38C (100.4F), Mild Pain (1 - 3)  aluminum hydroxide/magnesium hydroxide/simethicone Suspension 30 milliLiter(s) Oral every 4 hours PRN Dyspepsia  lidocaine 2% Gel 1 Application(s) Topical five times a day PRN rectal pain  melatonin 3 milliGRAM(s) Oral at bedtime PRN Insomnia  ondansetron Injectable 4 milliGRAM(s) IV Push every 8 hours PRN Nausea and/or Vomiting  oxyCODONE    IR 5 milliGRAM(s) Oral every 8 hours PRN Severe Pain (7 - 10)  oxyCODONE    IR 2.5 milliGRAM(s) Oral every 12 hours PRN Moderate Pain (4 - 6)      ALLERGIES:  Allergies    sulfa drugs (Hives; Rash)  Augmentin (Hives; Rash)  Gluten (Diarrhea)  dairy products (Unknown)    Intolerances        LABS:                        12.6   5.03  )-----------( 218      ( 20 Oct 2024 12:00 )             37.1             Urinalysis Basic - ( 20 Oct 2024 09:08 )    Color: Dark Yellow / Appearance: Clear / S.007 / pH: x  Gluc: x / Ketone: Negative mg/dL  / Bili: Small / Urobili: 1.0 mg/dL   Blood: x / Protein: Negative mg/dL / Nitrite: Positive   Leuk Esterase: Trace / RBC: 1 /HPF / WBC 0 /HPF   Sq Epi: x / Non Sq Epi: 0 /HPF / Bacteria: Negative /HPF      CAPILLARY BLOOD GLUCOSE          RADIOLOGY & ADDITIONAL TESTS: Reviewed.

## 2024-10-22 NOTE — DISCHARGE NOTE NURSING/CASE MANAGEMENT/SOCIAL WORK - FINANCIAL ASSISTANCE
[facility] provides services at a reduced cost to those who are determined to be eligible through [facility]’s financial assistance program. Information regarding [facility]’s financial assistance program can be found by going to [email or web address] or by calling [phone].

## 2024-10-22 NOTE — PROGRESS NOTE ADULT - PROBLEM SELECTOR PLAN 1
Pt p/w 2 months of severe lower abdominal pain L>R, radiating towards chest iso chronic constipation as well as concurrent urinary urgency related pain, w/ associated n/v, lightheadedness, and joint pains. CTAP shows stool burden, however patient reports that pain has persisted despite aggressive bowel regimen leading to liquid bowel movements. Patient reports pain associated with urinary frequency and urgency, similar to UTI although has been tested multiple times during this admission for UTI and has been negative.  Less likely abdominal pain 2/2 ovarian torsion, ectopic pregnancy, diverticulitis, GI obstruction given negative imaging and labs.   Repeat abd XRs showing nonobstructive bowel gas pattern. ISTOP reviewed.       Plan:  - GI previously consulted, recommended methylnaltrexone and bowel regimen  - pain regimen: lidocaine patches, heat and ice packs; tylenol 650mg q8 PRN, oxycodone 5mg q8hrs for mod pain, 2.5mg for mild pain Pt p/w 2 months of severe lower abdominal pain L>R, radiating towards chest iso chronic constipation as well as concurrent urinary urgency related pain, w/ associated n/v, lightheadedness, and joint pains. CTAP shows stool burden, however patient reports that pain has persisted despite aggressive bowel regimen leading to liquid bowel movements. Patient reports pain associated with urinary frequency and urgency, similar to UTI although has been tested multiple times during this admission for UTI and has been negative.  Less likely abdominal pain 2/2 ovarian torsion, ectopic pregnancy, diverticulitis, GI obstruction given negative imaging and labs.   Repeat abd XRs showing nonobstructive bowel gas pattern. ISTOP reviewed.   Iso negative imaging tests and regular BMs, pain is likely a mixed picture of IBS-C and interstitial cystitis.      Plan:  - GI previously consulted, recommended methylnaltrexone and bowel regimen  - pain regimen: lidocaine patches, heat and ice packs; tylenol 650mg q8 PRN, oxycodone 5mg q6hrs for mod pain, 2.5mg for mild pain  - Start amitriptyline 10mg qhs  - F/u GI and urology outpatient

## 2024-10-22 NOTE — PROGRESS NOTE ADULT - REASON FOR ADMISSION
severe abdominal pain

## 2024-10-22 NOTE — PROGRESS NOTE ADULT - NUTRITIONAL ASSESSMENT
This patient has been assessed with a concern for Malnutrition and has been determined to have a diagnosis/diagnoses of Severe protein-calorie malnutrition.    This patient is being managed with:   Diet Regular-  Gluten-Gliadin Restricted  Entered: Oct 15 2024  4:21PM

## 2024-10-28 ENCOUNTER — NON-APPOINTMENT (OUTPATIENT)
Age: 49
End: 2024-10-28

## 2024-10-28 ENCOUNTER — APPOINTMENT (OUTPATIENT)
Dept: GASTROENTEROLOGY | Facility: CLINIC | Age: 49
End: 2024-10-28
Payer: COMMERCIAL

## 2024-10-28 VITALS
OXYGEN SATURATION: 97 % | RESPIRATION RATE: 15 BRPM | BODY MASS INDEX: 18.68 KG/M2 | HEART RATE: 57 BPM | TEMPERATURE: 96.4 F | HEIGHT: 69.5 IN | SYSTOLIC BLOOD PRESSURE: 105 MMHG | DIASTOLIC BLOOD PRESSURE: 75 MMHG | WEIGHT: 129 LBS

## 2024-10-28 DIAGNOSIS — K59.00 CONSTIPATION, UNSPECIFIED: ICD-10-CM

## 2024-10-28 DIAGNOSIS — K62.5 HEMORRHAGE OF ANUS AND RECTUM: ICD-10-CM

## 2024-10-28 PROCEDURE — 99214 OFFICE O/P EST MOD 30 MIN: CPT

## 2024-10-28 RX ORDER — LUBIPROSTONE 8 UG/1
8 CAPSULE ORAL
Qty: 60 | Refills: 5 | Status: ACTIVE | COMMUNITY
Start: 2024-10-28 | End: 1900-01-01

## 2024-10-30 ENCOUNTER — APPOINTMENT (OUTPATIENT)
Dept: UROLOGY | Facility: CLINIC | Age: 49
End: 2024-10-30
Payer: COMMERCIAL

## 2024-10-30 ENCOUNTER — NON-APPOINTMENT (OUTPATIENT)
Age: 49
End: 2024-10-30

## 2024-10-30 VITALS
BODY MASS INDEX: 18.47 KG/M2 | OXYGEN SATURATION: 100 % | DIASTOLIC BLOOD PRESSURE: 72 MMHG | WEIGHT: 129 LBS | HEIGHT: 70 IN | SYSTOLIC BLOOD PRESSURE: 110 MMHG | HEART RATE: 68 BPM | TEMPERATURE: 97.5 F

## 2024-10-30 DIAGNOSIS — Z80.41 FAMILY HISTORY OF MALIGNANT NEOPLASM OF OVARY: ICD-10-CM

## 2024-10-30 DIAGNOSIS — K58.9 IRRITABLE BOWEL SYNDROME, UNSPECIFIED: ICD-10-CM

## 2024-10-30 DIAGNOSIS — R10.9 UNSPECIFIED ABDOMINAL PAIN: ICD-10-CM

## 2024-10-30 DIAGNOSIS — Z88.2 ALLERGY STATUS TO SULFONAMIDES: ICD-10-CM

## 2024-10-30 DIAGNOSIS — I49.5 SICK SINUS SYNDROME: ICD-10-CM

## 2024-10-30 DIAGNOSIS — R39.15 URGENCY OF URINATION: ICD-10-CM

## 2024-10-30 DIAGNOSIS — Z79.890 HORMONE REPLACEMENT THERAPY: ICD-10-CM

## 2024-10-30 DIAGNOSIS — E03.9 HYPOTHYROIDISM, UNSPECIFIED: ICD-10-CM

## 2024-10-30 DIAGNOSIS — N30.10 INTERSTITIAL CYSTITIS (CHRONIC) WITHOUT HEMATURIA: ICD-10-CM

## 2024-10-30 DIAGNOSIS — Z91.011 ALLERGY TO MILK PRODUCTS: ICD-10-CM

## 2024-10-30 DIAGNOSIS — E43 UNSPECIFIED SEVERE PROTEIN-CALORIE MALNUTRITION: ICD-10-CM

## 2024-10-30 DIAGNOSIS — Z80.3 FAMILY HISTORY OF MALIGNANT NEOPLASM OF BREAST: ICD-10-CM

## 2024-10-30 DIAGNOSIS — R73.9 HYPERGLYCEMIA, UNSPECIFIED: ICD-10-CM

## 2024-10-30 DIAGNOSIS — E83.52 HYPERCALCEMIA: ICD-10-CM

## 2024-10-30 PROBLEM — N32.81 OVERACTIVE BLADDER: Status: ACTIVE | Noted: 2024-10-30

## 2024-10-30 PROBLEM — R10.2 PELVIC PAIN: Status: ACTIVE | Noted: 2024-10-30

## 2024-10-30 PROCEDURE — 99204 OFFICE O/P NEW MOD 45 MIN: CPT

## 2024-10-30 RX ORDER — AMITRIPTYLINE HYDROCHLORIDE 10 MG/1
10 TABLET, FILM COATED ORAL
Qty: 30 | Refills: 2 | Status: ACTIVE | COMMUNITY
Start: 2024-10-30 | End: 1900-01-01

## 2024-11-04 ENCOUNTER — APPOINTMENT (OUTPATIENT)
Dept: UROLOGY | Facility: CLINIC | Age: 49
End: 2024-11-04
Payer: COMMERCIAL

## 2024-11-04 VITALS
SYSTOLIC BLOOD PRESSURE: 122 MMHG | HEART RATE: 65 BPM | OXYGEN SATURATION: 100 % | TEMPERATURE: 98 F | DIASTOLIC BLOOD PRESSURE: 80 MMHG

## 2024-11-04 DIAGNOSIS — N32.81 OVERACTIVE BLADDER: ICD-10-CM

## 2024-11-04 PROCEDURE — 99499A: CUSTOM | Mod: NC

## 2024-11-05 ENCOUNTER — APPOINTMENT (OUTPATIENT)
Dept: UROLOGY | Facility: CLINIC | Age: 49
End: 2024-11-05
Payer: COMMERCIAL

## 2024-11-05 PROCEDURE — 99205 OFFICE O/P NEW HI 60 MIN: CPT

## 2024-11-06 LAB
APPEARANCE: CLEAR
BACTERIA: NEGATIVE /HPF
BILIRUBIN URINE: NEGATIVE
BLOOD URINE: NEGATIVE
CALCIUM OXALATE CRYSTALS: PRESENT
CAST: 8 /LPF
COLOR: NORMAL
EPITHELIAL CELLS: 1 /HPF
GLUCOSE QUALITATIVE U: NEGATIVE MG/DL
HYALINE CASTS: PRESENT
KETONES URINE: ABNORMAL MG/DL
LEUKOCYTE ESTERASE URINE: ABNORMAL
MICROSCOPIC-UA: NORMAL
MUCUS: PRESENT
NITRITE URINE: NEGATIVE
PH URINE: 5.5
PROTEIN URINE: NORMAL MG/DL
RED BLOOD CELLS URINE: NORMAL /HPF
REVIEW: NORMAL
SPECIFIC GRAVITY URINE: 1.02
UROBILINOGEN URINE: 0.2 MG/DL
WHITE BLOOD CELLS URINE: 0 /HPF

## 2024-11-07 LAB — BACTERIA UR CULT: NORMAL

## 2024-11-13 ENCOUNTER — APPOINTMENT (OUTPATIENT)
Dept: PAIN MANAGEMENT | Facility: CLINIC | Age: 49
End: 2024-11-13

## 2024-11-13 VITALS
BODY MASS INDEX: 22.02 KG/M2 | HEART RATE: 70 BPM | OXYGEN SATURATION: 94 % | DIASTOLIC BLOOD PRESSURE: 83 MMHG | SYSTOLIC BLOOD PRESSURE: 118 MMHG | WEIGHT: 129 LBS | HEIGHT: 64 IN

## 2024-11-13 DIAGNOSIS — R10.2 PELVIC AND PERINEAL PAIN: ICD-10-CM

## 2024-11-13 PROCEDURE — 99205 OFFICE O/P NEW HI 60 MIN: CPT

## 2024-11-14 RX ORDER — TRAMADOL HYDROCHLORIDE 100 MG/1
100 TABLET, EXTENDED RELEASE ORAL
Qty: 30 | Refills: 0 | Status: ACTIVE | COMMUNITY
Start: 2024-11-14 | End: 1900-01-01

## 2024-11-21 ENCOUNTER — APPOINTMENT (OUTPATIENT)
Dept: UROLOGY | Facility: CLINIC | Age: 49
End: 2024-11-21
Payer: COMMERCIAL

## 2024-11-21 VITALS
SYSTOLIC BLOOD PRESSURE: 112 MMHG | HEART RATE: 75 BPM | TEMPERATURE: 97.5 F | DIASTOLIC BLOOD PRESSURE: 80 MMHG | OXYGEN SATURATION: 96 %

## 2024-11-21 LAB
BILIRUB UR QL STRIP: NORMAL
CLARITY UR: CLEAR
COLLECTION METHOD: NORMAL
GLUCOSE UR-MCNC: NORMAL
HCG UR QL: 0.2 EU/DL
HGB UR QL STRIP.AUTO: NORMAL
KETONES UR-MCNC: NORMAL
LEUKOCYTE ESTERASE UR QL STRIP: NORMAL
NITRITE UR QL STRIP: NORMAL
PH UR STRIP: 5
PROT UR STRIP-MCNC: NORMAL
SP GR UR STRIP: 1.02

## 2024-11-21 PROCEDURE — 52000 CYSTOURETHROSCOPY: CPT

## 2024-11-21 PROCEDURE — 81003 URINALYSIS AUTO W/O SCOPE: CPT | Mod: QW

## 2024-11-25 LAB — BACTERIA UR CULT: NORMAL

## 2024-12-03 ENCOUNTER — APPOINTMENT (OUTPATIENT)
Dept: PAIN MANAGEMENT | Facility: CLINIC | Age: 49
End: 2024-12-03

## 2024-12-03 VITALS
HEIGHT: 64 IN | BODY MASS INDEX: 22.02 KG/M2 | SYSTOLIC BLOOD PRESSURE: 129 MMHG | OXYGEN SATURATION: 98 % | DIASTOLIC BLOOD PRESSURE: 85 MMHG | HEART RATE: 99 BPM | WEIGHT: 129 LBS

## 2024-12-03 DIAGNOSIS — R10.2 PELVIC AND PERINEAL PAIN: ICD-10-CM

## 2024-12-03 PROCEDURE — 99215 OFFICE O/P EST HI 40 MIN: CPT

## 2024-12-03 RX ORDER — CYCLOBENZAPRINE HYDROCHLORIDE 5 MG/1
5 TABLET, FILM COATED ORAL DAILY
Qty: 7 | Refills: 0 | Status: ACTIVE | COMMUNITY
Start: 2024-12-03 | End: 1900-01-01

## 2025-01-07 DIAGNOSIS — K58.1 IRRITABLE BOWEL SYNDROME WITH CONSTIPATION: ICD-10-CM

## 2025-01-12 ENCOUNTER — EMERGENCY (EMERGENCY)
Facility: HOSPITAL | Age: 50
LOS: 1 days | Discharge: HOME | End: 2025-01-12
Attending: EMERGENCY MEDICINE | Admitting: EMERGENCY MEDICINE
Payer: COMMERCIAL

## 2025-01-12 VITALS
HEART RATE: 83 BPM | OXYGEN SATURATION: 99 % | WEIGHT: 128.97 LBS | TEMPERATURE: 99 F | RESPIRATION RATE: 19 BRPM | DIASTOLIC BLOOD PRESSURE: 90 MMHG | SYSTOLIC BLOOD PRESSURE: 119 MMHG | HEIGHT: 70 IN

## 2025-01-12 VITALS
DIASTOLIC BLOOD PRESSURE: 85 MMHG | HEART RATE: 64 BPM | RESPIRATION RATE: 17 BRPM | TEMPERATURE: 98 F | OXYGEN SATURATION: 99 % | SYSTOLIC BLOOD PRESSURE: 134 MMHG

## 2025-01-12 PROCEDURE — 29125 APPL SHORT ARM SPLINT STATIC: CPT | Mod: LT

## 2025-01-12 PROCEDURE — 73130 X-RAY EXAM OF HAND: CPT | Mod: 26,LT

## 2025-01-12 PROCEDURE — 73110 X-RAY EXAM OF WRIST: CPT | Mod: 26,LT

## 2025-01-12 PROCEDURE — 73130 X-RAY EXAM OF HAND: CPT

## 2025-01-12 PROCEDURE — 99283 EMERGENCY DEPT VISIT LOW MDM: CPT | Mod: 25

## 2025-01-12 PROCEDURE — 73110 X-RAY EXAM OF WRIST: CPT

## 2025-01-12 PROCEDURE — 99284 EMERGENCY DEPT VISIT MOD MDM: CPT | Mod: 25

## 2025-01-12 RX ORDER — OXYCODONE HCL 15 MG
1 TABLET ORAL
Qty: 9 | Refills: 0
Start: 2025-01-12 | End: 2025-01-14

## 2025-01-12 NOTE — ED PROVIDER NOTE - MUSCULOSKELETAL, MLM
Spine appears normal, range of motion is not limited, no muscle or joint tenderness; L UE: no elbow tend, no forearm tend, no swelling to wrist, no direct tend over distal radius or ulnar, FROM, + tend over 5th metacarpal, no swelling, + ecchymosis to 4th distal phalanx, able to wiggle all digits, + light touch, no U/M/R nerve deficits, limited active ROM

## 2025-01-12 NOTE — ED PROVIDER NOTE - CLINICAL SUMMARY MEDICAL DECISION MAKING FREE TEXT BOX
pt c/o L hand / wrist pain s/p punching carpet earlier today, is R hand dominant., neurovascular intact, no breaks in skin, no deformity, xray              , given percocet for pain as requested - states that unable to tolerated ibuprofen and already took ultram, pt c/o L hand / wrist pain s/p punching carpet earlier today, is R hand dominant., neurovascular intact, no breaks in skin, no deformity, xray neg but splinted for comfort, given percocet for pain as requested - states that unable to tolerated ibuprofen and already took ultram, pt requesting rx for oxy, encouraged to take tyl, to RICE and f/u w/ortho for eval and further management. pt understands and agrees w/plan, strict return precautions given

## 2025-01-12 NOTE — ED ADULT NURSE NOTE - NSFALLUNIVINTERV_ED_ALL_ED
Bed/Stretcher in lowest position, wheels locked, appropriate side rails in place/Call bell, personal items and telephone in reach/Instruct patient to call for assistance before getting out of bed/chair/stretcher/Non-slip footwear applied when patient is off stretcher/[place] to call system/Physically safe environment - no spills, clutter or unnecessary equipment/Purposeful proactive rounding/Room/bathroom lighting operational, light cord in reach

## 2025-01-12 NOTE — ED PROVIDER NOTE - CARE PROVIDER_API CALL
Tommie Mckenna  Orthopaedic Surgery  [address] [place] [address], [id number]  [place] [address]  Phone: [phone]  Fax: [phone]  Follow Up Time:     Justin Kapadia.  Surgery of the Hand  [address]  [place] [address]  Phone: [phone]  Fax: [phone]  Follow Up Time:

## 2025-01-12 NOTE — ED ADULT NURSE NOTE - NSSUHOSCREENINGYN_ED_ALL_ED
Patient Information     Patient Name Bibiana Alvarenga [id number] Female 1975      Telephone Encounter by Gauri Gutierrez at 2017 11:18 AM     Author:  Gauri Gutierrez Service:  (none) Author Type:  (none)     Filed:  2017 11:22 AM Encounter Date:  2017 Status:  Signed     :  Gauri Gutierrez            Spoke with Bibiana and she is wondering if any STI testing was done when she had the HPV testing done. I informed Bibiana that it was not done at that time and normally is not done unless concern is raised. Also informed her that depending on what she is specifically wanting to be tested for it can be done by urine, swab or blood work.  Gauri Gutierrez LPN............................... 2017 11:21 AM          
Patient Information     Patient Name MRN Bibiana Olmos [id number] Female 1975      Telephone Encounter by Chucky Gutierrez at 2017  9:57 AM     Author:  Chucky Gutierrez Service:  (none) Author Type:  (none)     Filed:  2017  9:57 AM Encounter Date:  2017 Status:  Signed     :  Chucky Gutierrez            SMS-Pt would like to speak to nurse, has some questions. Please call.  Thank you,  Chucky Gutierrez ....................  2017   9:57 AM          
Yes - the patient is able to be screened

## 2025-01-12 NOTE — ED PROVIDER NOTE - OBJECTIVE STATEMENT
The pt is a 50 y/o F, who presents to ED c/o L hand and wrist pain s/p punching a carpet (floor) today. R hand dominant. Pain is constant, 8/10, aggravated w/touching and moving, took a tramadol w/o relief. Denies numbness or tingling to fingers, elbow pain, breaks in skin, bleeding, any other injuries.

## 2025-01-12 NOTE — ED PROVIDER NOTE - PATIENT PORTAL LINK FT
You can access the FollowMyHealth Patient Portal offered by [facility] by registering at the following website: [email or web address]. By joining [facility]’s FollowMyHealth portal, you will also be able to view your health information using other applications (apps) compatible with our system.

## 2025-01-12 NOTE — ED PROVIDER NOTE - NSFOLLOWUPINSTRUCTIONS_ED_ALL_ED_FT
REST, ICE, ELEVATE, SPLINT SHOULD STAY ON UNTIL YOU FOLLOW UP WITH AN ORTHOPEDIST, SLING TO HELP ELEVATE, TAKE TYLENOL AS NEEDED FOR PAIN - OXYCODONE FOR SEVERE PAIN ONLY, RETURN TO ED FOR ANY WORSENING OR CONCERNING SYMPTOMS  Many injuries can be cared for with rest, ice, compression, and elevation. This is also called RICE therapy.  RICE therapy includes:  Resting the injured body part.  Putting ice on the injury.  Putting pressure on the injury. This is also called compression.  Raising the injured part. This is also called elevation.  RICE therapy can help reduce pain and swelling.  Supplies needed:  Ice.  Plastic bag.  Towel.  Elastic bandage.  Pillow or pillows to raise the injured body part.  How to care for your injury with RICE therapy  Rest  Try to rest the injured part of your body.  You can go back to your normal activities when your health care provider says it's okay to do them and when you can do them without pain.  Ask what things are safe for you to do.  Some injuries heal better with early movement instead of resting. If you rest the injury too much, it may not heal as well. Ask your provider if you should do exercises to help your injury get better.  Ice  Bag of ice on a towel on the skin.  Putting ice on your injury can help to lessen swelling and pain. Do not apply ice directly to your skin. Use ice on as many days as told by your provider.  If told, put ice on the area.  Put ice in a plastic bag.  Place a towel between your skin and the bag.  Leave the ice on for 20 minutes, 2–3 times a day.  If your skin turns bright red, take off the ice right away to prevent skin damage. The risk of damage is higher if you can't feel pain, heat, or cold.  Compression  A person wrapping a bandage around an ankle.  Put pressure, also called compression, on your injured area. This can be done with an elastic bandage. If this type of bandage has been put on your injury:  Follow instructions on the package the bandage came in about how to use it.  Do not wrap the bandage too tightly.  Wrap the bandage more loosely if part of your body beyond the bandage looks blue, or is swollen, cold, painful, or loses feeling.  Take off the bandage and put it on again every 3–4 hours or as told by your provider.  Call your provider if the bandage seems to make your injury worse.  Elevation  Raise the injured area above the level of your heart while you're sitting or lying down. Use a pillow to support your injured area as needed.  Follow these instructions at home:  If your symptoms get worse or last a long time, make a follow-up appointment with your provider. You may need to have imaging tests, such as X-rays or an MRI.  If you have imaging tests, ask how to get your results when they are ready.  Contact a health care provider if:  You keep having pain and swelling.  Your symptoms get worse.  Get help right away if:  You have sudden, very bad pain at your injury or lower than your injury.  You have tingling or numbness at your injury or lower than your injury, and it does not go away when you take the bandage off.  Cast or Splint Care, Adult  Casts and splints are supports that are worn to protect broken bones and other injuries. A cast or splint may hold a bone still and in the correct position while it heals. Casts and splints may also help with pain, swelling, and muscle spasms.  A cast is a hardened support that is usually made of fiberglass or plaster. It is custom-fit to the body and offers more protection than a splint. Most casts cannot be taken off and put back on. A splint is a type of soft support that is usually made from cloth and elastic. It can be adjusted or taken off as needed. Often, splints are used on broken bones at first. Later, a cast can replace the splint.  What are the risks?  In some cases, wearing a cast or splint can make it so that less blood gets to the wrist or hand or to the foot and toes. This can happen if there is a lot of swelling or if the cast or splint is too tight. Limited blood supply can cause a problem called compartment syndrome. This can lead to lasting damage. Symptoms include:  Pain that gets worse.  Numbness and tingling.  Changes in skin color, including paleness or a bluish color.  Cold fingers or toes.  Other problems from wearing a cast or splint can include:  Skin irritation that can cause:  Itching.  Rash.  Skin sores.  Skin infection.  Limb stiffness or weakness.  How to care for a cast or splint that cannot be taken off  A person checking the skin around a cast on his foot and lower leg.  Do not put pressure on any part of the cast or splint until it is fully hardened.  Do not stick anything inside the cast or splint to scratch your skin.  Check the skin around the cast or splint every day. Tell your doctor if you see problems.  You may put lotion on dry skin around the cast or splint. Do not put lotion on the skin under the cast or splint.  Keep the cast or splint clean and dry.  How to care for your splint that you can take off  Wear the splint as told by your doctor. Take it off only as told by your doctor.  Check the skin around it every day. Tell your doctor if you see problems.  Loosen it if your fingers or toes:  Tingle.  Become numb.  Turn cold and blue.  Keep it clean and dry. Clean your splint as told by your doctor. Use mild soap and water and let it air-dry. Do not use heat on the splint.  Follow these instructions at home:  Bathing  Do not take baths, swim, or use a hot tub until your doctor approves. Ask your doctor if you may take showers. You may only be allowed to take sponge baths.  If the cast or splint is not waterproof:  Do not let it get wet.  Cover it with a watertight covering when you take a bath or a shower.  Managing pain, stiffness, and swelling  A person resting with her lower leg elevated.   If told, put ice on the affected area. To do this:  If you have a cast or splint that can be taken off, take it off as told by your doctor.  Put ice in a plastic bag.  Place a towel between your skin and the bag or between your cast and the bag.  Leave the ice on for 20 minutes, 2–3 times a day.  Take off the ice if your skin turns bright red. This is very important. If you cannot feel pain, heat, or cold, you have a greater risk of damage to the area.  Move your fingers or toes often.  Raise the injured area above the level of your heart while you are sitting or lying down.  Safety  Do not use your injured leg or foot to support your body weight until your doctor says that you can.  Use crutches or other helpful (assistive) devices as told by your doctor.  Ask your doctor when it is safe to drive if you have a cast or splint on part of your body.  General instructions  Take over-the-counter and prescription medicines only as told by your doctor.  Return to your normal activities as told by your doctor. Ask your doctor what activities are safe for you.  Keep all follow-up visits. This is important.  Contact a doctor if:  The skin around the cast or splint gets red or raw.  The skin under the cast is very itchy or painful.  Your cast or splint:  Gets damaged.  Feels very uncomfortable.  Is too tight or too loose.  Your cast becomes wet or it starts to have a soft spot or area.  There is a bad smell coming from under your cast.  You get an object stuck under your cast.  Get help right away if:  You get any symptoms of compartment syndrome, such as:  Very bad pain or pressure under the cast.  Numbness, tingling, coldness, or pale or bluish skin.  The part of your body above or below the cast is swollen, and it turns a different color (is discolored).  You cannot feel or move your fingers or toes.  Your pain gets worse.  There is fluid leaking through the cast.  You have trouble breathing or shortness of breath.  You have chest pain.  These symptoms may be an emergency. Get help right away. Call your local emergency services (911 in the [place]  Do not wait to see if the symptoms will go away.  Do not drive yourself to the hospital.  Summary  Casts and splints are worn to protect broken bones and other injuries.  Keep your cast or splint clean and dry.  Take off your cast or splint only as told by your doctor.  Get help right away if you have very bad pain, numbness, tingling, or skin that turns cold or another color.

## 2025-01-12 NOTE — ED ADULT NURSE NOTE - OBJECTIVE STATEMENT
Pt A&Ox4 presents to ED with complaints of left hand pain. Pt reports, "around 9am this morning I punched the floor." Reports pain to left wrist, hand, and 5th digit. Pt has full sensation in left upper extremity. Bruising noted to left 5th digit and hand. Pt has limited strength in left hand d/t pain. Denies numbness/tingling. Pt A&Ox4 presents to ED with complaints of left hand pain. Pt reports, "around 9am this morning I punched the floor." Reports pain to left wrist, hand, and 5th digit. Pt has full sensation in left upper extremity. Bruising noted to left 5th digit and hand. Pt has limited strength in left hand d/t pain. Denies numbness/tingling. Took tramadol around 12:15AM.

## 2025-01-14 RX ORDER — LINACLOTIDE 72 UG/1
72 CAPSULE, GELATIN COATED ORAL
Qty: 30 | Refills: 3 | Status: ACTIVE | COMMUNITY
Start: 2025-01-07 | End: 1900-01-01
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
## 2025-01-16 DIAGNOSIS — Z91.018 ALLERGY TO OTHER FOODS: ICD-10-CM

## 2025-01-16 DIAGNOSIS — Z88.1 ALLERGY STATUS TO OTHER ANTIBIOTIC AGENTS: ICD-10-CM

## 2025-01-16 DIAGNOSIS — Z88.2 ALLERGY STATUS TO SULFONAMIDES: ICD-10-CM
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
## 2025-01-16 DIAGNOSIS — M25.532 PAIN IN LEFT WRIST: ICD-10-CM

## 2025-01-16 DIAGNOSIS — S60.042A CONTUSION OF LEFT RING FINGER WITHOUT DAMAGE TO NAIL, INITIAL ENCOUNTER: ICD-10-CM

## 2025-01-16 DIAGNOSIS — Z91.011 ALLERGY TO MILK PRODUCTS: ICD-10-CM

## 2025-01-21 ENCOUNTER — APPOINTMENT (OUTPATIENT)
Dept: ORTHOPEDIC SURGERY | Age: 50
End: 2025-01-21
Payer: COMMERCIAL

## 2025-01-21 DIAGNOSIS — S69.92XD UNSPECIFIED INJURY OF LEFT WRIST, HAND AND FINGER(S), SUBSEQUENT ENCOUNTER: ICD-10-CM

## 2025-01-21 PROCEDURE — 73110 X-RAY EXAM OF WRIST: CPT | Mod: LT

## 2025-01-21 PROCEDURE — 99204 OFFICE O/P NEW MOD 45 MIN: CPT

## 2025-02-06 ENCOUNTER — APPOINTMENT (OUTPATIENT)
Dept: ORTHOPEDIC SURGERY | Age: 50
End: 2025-02-06
Payer: COMMERCIAL

## 2025-02-06 DIAGNOSIS — S69.92XD UNSPECIFIED INJURY OF LEFT WRIST, HAND AND FINGER(S), SUBSEQUENT ENCOUNTER: ICD-10-CM

## 2025-02-06 PROCEDURE — 99213 OFFICE O/P EST LOW 20 MIN: CPT | Mod: 93

## 2025-03-12 ENCOUNTER — APPOINTMENT (OUTPATIENT)
Dept: ORTHOPEDIC SURGERY | Facility: CLINIC | Age: 50
End: 2025-03-12
Payer: COMMERCIAL

## 2025-03-12 DIAGNOSIS — S69.92XD UNSPECIFIED INJURY OF LEFT WRIST, HAND AND FINGER(S), SUBSEQUENT ENCOUNTER: ICD-10-CM

## 2025-03-12 PROCEDURE — 99214 OFFICE O/P EST MOD 30 MIN: CPT

## 2025-03-12 PROCEDURE — 73130 X-RAY EXAM OF HAND: CPT | Mod: LT

## 2025-03-14 ENCOUNTER — APPOINTMENT (OUTPATIENT)
Dept: ORTHOPEDIC SURGERY | Facility: CLINIC | Age: 50
End: 2025-03-14
Payer: COMMERCIAL

## 2025-03-14 VITALS
BODY MASS INDEX: 21.68 KG/M2 | DIASTOLIC BLOOD PRESSURE: 86 MMHG | HEIGHT: 64 IN | OXYGEN SATURATION: 97 % | HEART RATE: 96 BPM | TEMPERATURE: 97.2 F | SYSTOLIC BLOOD PRESSURE: 120 MMHG | WEIGHT: 127 LBS

## 2025-03-14 DIAGNOSIS — M54.50 LOW BACK PAIN, UNSPECIFIED: ICD-10-CM

## 2025-03-14 PROCEDURE — 99215 OFFICE O/P EST HI 40 MIN: CPT

## 2025-03-14 PROCEDURE — 72110 X-RAY EXAM L-2 SPINE 4/>VWS: CPT

## 2025-03-14 RX ORDER — TIZANIDINE 4 MG/1
4 TABLET ORAL 3 TIMES DAILY
Qty: 40 | Refills: 2 | Status: ACTIVE | COMMUNITY
Start: 2025-03-14 | End: 1900-01-01

## 2025-03-18 PROBLEM — R92.30 DENSE BREASTS: Status: ACTIVE | Noted: 2025-03-18

## 2025-03-20 ENCOUNTER — NON-APPOINTMENT (OUTPATIENT)
Age: 50
End: 2025-03-20

## 2025-03-20 ENCOUNTER — APPOINTMENT (OUTPATIENT)
Dept: BREAST CENTER | Facility: CLINIC | Age: 50
End: 2025-03-20
Payer: COMMERCIAL

## 2025-03-20 VITALS
BODY MASS INDEX: 19.26 KG/M2 | SYSTOLIC BLOOD PRESSURE: 112 MMHG | WEIGHT: 130 LBS | DIASTOLIC BLOOD PRESSURE: 78 MMHG | HEART RATE: 60 BPM | HEIGHT: 69 IN

## 2025-03-20 DIAGNOSIS — R92.30 DENSE BREASTS, UNSPECIFIED: ICD-10-CM

## 2025-03-20 PROCEDURE — 99213 OFFICE O/P EST LOW 20 MIN: CPT

## 2025-03-20 RX ORDER — OXYCODONE AND ACETAMINOPHEN 5; 325 MG/1; MG/1
5-325 TABLET ORAL
Refills: 0 | Status: ACTIVE | COMMUNITY

## 2025-03-27 ENCOUNTER — APPOINTMENT (OUTPATIENT)
Dept: GASTROENTEROLOGY | Facility: CLINIC | Age: 50
End: 2025-03-27

## 2025-06-02 ENCOUNTER — APPOINTMENT (OUTPATIENT)
Dept: RHEUMATOLOGY | Facility: CLINIC | Age: 50
End: 2025-06-02
Payer: COMMERCIAL

## 2025-06-02 ENCOUNTER — LABORATORY RESULT (OUTPATIENT)
Age: 50
End: 2025-06-02

## 2025-06-02 VITALS
DIASTOLIC BLOOD PRESSURE: 80 MMHG | TEMPERATURE: 97.2 F | HEIGHT: 69 IN | SYSTOLIC BLOOD PRESSURE: 122 MMHG | HEART RATE: 77 BPM | BODY MASS INDEX: 18.96 KG/M2 | WEIGHT: 128 LBS | OXYGEN SATURATION: 100 %

## 2025-06-02 DIAGNOSIS — R20.2 PARESTHESIA OF SKIN: ICD-10-CM

## 2025-06-02 DIAGNOSIS — Z86.39 PERSONAL HISTORY OF OTHER ENDOCRINE, NUTRITIONAL AND METABOLIC DISEASE: ICD-10-CM

## 2025-06-02 DIAGNOSIS — M79.642 PAIN IN RIGHT HAND: ICD-10-CM

## 2025-06-02 DIAGNOSIS — Z87.2 PERSONAL HISTORY OF DISEASES OF THE SKIN AND SUBCUTANEOUS TISSUE: ICD-10-CM

## 2025-06-02 DIAGNOSIS — M79.641 PAIN IN RIGHT HAND: ICD-10-CM

## 2025-06-02 DIAGNOSIS — H04.129 DRY EYE SYNDROME OF UNSPECIFIED LACRIMAL GLAND: ICD-10-CM

## 2025-06-02 DIAGNOSIS — R53.83 OTHER FATIGUE: ICD-10-CM

## 2025-06-02 DIAGNOSIS — R23.3 SPONTANEOUS ECCHYMOSES: ICD-10-CM

## 2025-06-02 PROCEDURE — 36415 COLL VENOUS BLD VENIPUNCTURE: CPT

## 2025-06-02 PROCEDURE — 99205 OFFICE O/P NEW HI 60 MIN: CPT

## 2025-06-02 PROCEDURE — G2211 COMPLEX E/M VISIT ADD ON: CPT | Mod: NC

## 2025-06-03 ENCOUNTER — APPOINTMENT (OUTPATIENT)
Dept: RADIOLOGY | Facility: CLINIC | Age: 50
End: 2025-06-03
Payer: COMMERCIAL

## 2025-06-03 ENCOUNTER — OUTPATIENT (OUTPATIENT)
Dept: OUTPATIENT SERVICES | Facility: HOSPITAL | Age: 50
LOS: 1 days | End: 2025-06-03

## 2025-06-03 LAB
25(OH)D3 SERPL-MCNC: 49.9 NG/ML
ALBUMIN SERPL ELPH-MCNC: 4.6 G/DL
ALP BLD-CCNC: 90 U/L
ALT SERPL-CCNC: 16 U/L
ANION GAP SERPL CALC-SCNC: 16 MMOL/L
APTT BLD: 37.6 SEC
AST SERPL-CCNC: 22 U/L
BASOPHILS # BLD AUTO: 0.06 K/UL
BASOPHILS NFR BLD AUTO: 1.3 %
BILIRUB SERPL-MCNC: 0.3 MG/DL
BUN SERPL-MCNC: 12 MG/DL
CALCIUM SERPL-MCNC: 9.7 MG/DL
CCP AB SER IA-ACNC: <8 U/ML
CHLORIDE SERPL-SCNC: 109 MMOL/L
CO2 SERPL-SCNC: 19 MMOL/L
CREAT SERPL-MCNC: 0.83 MG/DL
CRP SERPL-MCNC: <3 MG/L
EGFRCR SERPLBLD CKD-EPI 2021: 86 ML/MIN/1.73M2
ENA SS-A AB SER IA-ACNC: <0.2 AL
ENA SS-B AB SER IA-ACNC: <0.2 AL
EOSINOPHIL # BLD AUTO: 0.06 K/UL
EOSINOPHIL NFR BLD AUTO: 1.3 %
ERYTHROCYTE [SEDIMENTATION RATE] IN BLOOD BY WESTERGREN METHOD: 8 MM/HR
FOLATE SERPL-MCNC: 9.3 NG/ML
GLUCOSE SERPL-MCNC: 98 MG/DL
HCT VFR BLD CALC: 39.2 %
HGB BLD-MCNC: 13.2 G/DL
IMM GRANULOCYTES NFR BLD AUTO: 0.2 %
INR PPP: 0.97 RATIO
LYMPHOCYTES # BLD AUTO: 1.89 K/UL
LYMPHOCYTES NFR BLD AUTO: 39.4 %
MAN DIFF?: NORMAL
MCHC RBC-ENTMCNC: 33.2 PG
MCHC RBC-ENTMCNC: 33.7 G/DL
MCV RBC AUTO: 98.7 FL
MONOCYTES # BLD AUTO: 0.37 K/UL
MONOCYTES NFR BLD AUTO: 7.7 %
NEUTROPHILS # BLD AUTO: 2.41 K/UL
NEUTROPHILS NFR BLD AUTO: 50.1 %
PLATELET # BLD AUTO: 238 K/UL
POTASSIUM SERPL-SCNC: 5.5 MMOL/L
PROT SERPL-MCNC: 6.9 G/DL
PT BLD: 11.4 SEC
RBC # BLD: 3.97 M/UL
RBC # FLD: 13.2 %
RF+CCP IGG SER-IMP: NEGATIVE
RHEUMATOID FACT SER QL: <10 IU/ML
SODIUM SERPL-SCNC: 144 MMOL/L
VIT B12 SERPL-MCNC: 308 PG/ML
WBC # FLD AUTO: 4.8 K/UL

## 2025-06-03 PROCEDURE — 73120 X-RAY EXAM OF HAND: CPT | Mod: 26,RT

## 2025-06-04 LAB — ANACR T: NEGATIVE

## 2025-06-05 LAB — HLA-B27 QL NAA+PROBE: NORMAL

## 2025-06-07 LAB — VIT C SERPL-MCNC: 1.4 MG/DL

## 2025-06-16 ENCOUNTER — APPOINTMENT (OUTPATIENT)
Dept: RHEUMATOLOGY | Facility: CLINIC | Age: 50
End: 2025-06-16
Payer: COMMERCIAL

## 2025-06-16 VITALS
OXYGEN SATURATION: 97 % | TEMPERATURE: 96.7 F | BODY MASS INDEX: 18.96 KG/M2 | WEIGHT: 128 LBS | HEIGHT: 69 IN | HEART RATE: 110 BPM | DIASTOLIC BLOOD PRESSURE: 75 MMHG | SYSTOLIC BLOOD PRESSURE: 114 MMHG

## 2025-06-16 PROBLEM — R79.1 ELEVATED PARTIAL THROMBOPLASTIN TIME (PTT): Status: ACTIVE | Noted: 2025-06-16

## 2025-06-16 PROBLEM — E87.5 SERUM POTASSIUM ELEVATED: Status: ACTIVE | Noted: 2025-06-16

## 2025-06-16 PROCEDURE — 36415 COLL VENOUS BLD VENIPUNCTURE: CPT

## 2025-06-16 PROCEDURE — G2211 COMPLEX E/M VISIT ADD ON: CPT | Mod: NC

## 2025-06-16 PROCEDURE — 99214 OFFICE O/P EST MOD 30 MIN: CPT

## 2025-06-17 LAB
ANION GAP SERPL CALC-SCNC: 11 MMOL/L
BUN SERPL-MCNC: 11 MG/DL
CALCIUM SERPL-MCNC: 9.8 MG/DL
CHLORIDE SERPL-SCNC: 105 MMOL/L
CO2 SERPL-SCNC: 24 MMOL/L
CREAT SERPL-MCNC: 0.79 MG/DL
EGFRCR SERPLBLD CKD-EPI 2021: 91 ML/MIN/1.73M2
GLUCOSE SERPL-MCNC: 89 MG/DL
POTASSIUM SERPL-SCNC: 5.3 MMOL/L
SODIUM SERPL-SCNC: 140 MMOL/L

## 2025-09-17 ENCOUNTER — APPOINTMENT (OUTPATIENT)
Dept: ULTRASOUND IMAGING | Facility: CLINIC | Age: 50
End: 2025-09-17
Payer: COMMERCIAL

## 2025-09-17 PROCEDURE — 76882 US LMTD JT/FCL EVL NVASC XTR: CPT | Mod: 26,LT
